# Patient Record
Sex: FEMALE | Race: WHITE
[De-identification: names, ages, dates, MRNs, and addresses within clinical notes are randomized per-mention and may not be internally consistent; named-entity substitution may affect disease eponyms.]

---

## 2020-05-18 ENCOUNTER — HOSPITAL ENCOUNTER (EMERGENCY)
Dept: HOSPITAL 47 - EC | Age: 85
Discharge: HOME | End: 2020-05-18
Payer: MEDICARE

## 2020-05-18 VITALS — DIASTOLIC BLOOD PRESSURE: 71 MMHG | SYSTOLIC BLOOD PRESSURE: 110 MMHG | HEART RATE: 70 BPM

## 2020-05-18 VITALS — TEMPERATURE: 97 F | RESPIRATION RATE: 14 BRPM

## 2020-05-18 DIAGNOSIS — Z88.5: ICD-10-CM

## 2020-05-18 DIAGNOSIS — Z88.0: ICD-10-CM
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
## 2020-05-18 DIAGNOSIS — S32.592A: Primary | ICD-10-CM
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
## 2020-05-18 LAB
ALBUMIN SERPL-MCNC: 2.9 G/DL (ref 3.5–5)
ALP SERPL-CCNC: 122 U/L (ref 38–126)
ALT SERPL-CCNC: 20 U/L (ref 4–34)
ANION GAP SERPL CALC-SCNC: 10 MMOL/L
APTT BLD: 25.8 SEC (ref 22–30)
AST SERPL-CCNC: 93 U/L (ref 14–36)
BASOPHILS # BLD AUTO: 0 K/UL (ref 0–0.2)
BASOPHILS NFR BLD AUTO: 1 %
BUN SERPL-SCNC: 8 MG/DL (ref 7–17)
CALCIUM SPEC-MCNC: 8.6 MG/DL (ref 8.4–10.2)
CHLORIDE SERPL-SCNC: 105 MMOL/L (ref 98–107)
CO2 SERPL-SCNC: 22 MMOL/L (ref 22–30)
EOSINOPHIL # BLD AUTO: 0.1 K/UL (ref 0–0.7)
EOSINOPHIL NFR BLD AUTO: 2 %
ERYTHROCYTE [DISTWIDTH] IN BLOOD BY AUTOMATED COUNT: 3.72 M/UL (ref 3.8–5.4)
ERYTHROCYTE [DISTWIDTH] IN BLOOD: 15.5 % (ref 11.5–15.5)
GLUCOSE SERPL-MCNC: 98 MG/DL (ref 74–99)
HCT VFR BLD AUTO: 35.3 % (ref 34–46)
HGB BLD-MCNC: 11.3 GM/DL (ref 11.4–16)
INR PPP: 1.4 (ref ?–1.2)
LYMPHOCYTES # SPEC AUTO: 0.6 K/UL (ref 1–4.8)
LYMPHOCYTES NFR SPEC AUTO: 13 %
MCH RBC QN AUTO: 30.3 PG (ref 25–35)
MCHC RBC AUTO-ENTMCNC: 32 G/DL (ref 31–37)
MCV RBC AUTO: 94.8 FL (ref 80–100)
MONOCYTES # BLD AUTO: 0.2 K/UL (ref 0–1)
MONOCYTES NFR BLD AUTO: 5 %
NEUTROPHILS # BLD AUTO: 3.4 K/UL (ref 1.3–7.7)
NEUTROPHILS NFR BLD AUTO: 79 %
PLATELET # BLD AUTO: 139 K/UL (ref 150–450)
POTASSIUM SERPL-SCNC: 4.4 MMOL/L (ref 3.5–5.1)
PROT SERPL-MCNC: 6.9 G/DL (ref 6.3–8.2)
PT BLD: 13.6 SEC (ref 9–12)
SODIUM SERPL-SCNC: 137 MMOL/L (ref 137–145)
WBC # BLD AUTO: 4.3 K/UL (ref 3.8–10.6)

## 2020-05-18 PROCEDURE — 96374 THER/PROPH/DIAG INJ IV PUSH: CPT

## 2020-05-18 PROCEDURE — 85025 COMPLETE CBC W/AUTO DIFF WBC: CPT

## 2020-05-18 PROCEDURE — 85610 PROTHROMBIN TIME: CPT

## 2020-05-18 PROCEDURE — 73502 X-RAY EXAM HIP UNI 2-3 VIEWS: CPT

## 2020-05-18 PROCEDURE — 71045 X-RAY EXAM CHEST 1 VIEW: CPT

## 2020-05-18 PROCEDURE — 99284 EMERGENCY DEPT VISIT MOD MDM: CPT

## 2020-05-18 PROCEDURE — 85730 THROMBOPLASTIN TIME PARTIAL: CPT

## 2020-05-18 PROCEDURE — 36415 COLL VENOUS BLD VENIPUNCTURE: CPT

## 2020-05-18 PROCEDURE — 80053 COMPREHEN METABOLIC PANEL: CPT

## 2020-05-18 PROCEDURE — 73700 CT LOWER EXTREMITY W/O DYE: CPT

## 2020-05-18 NOTE — CT
EXAMINATION TYPE: CT hip LT wo con

 

DATE OF EXAM: 5/18/2020

 

COMPARISON: X-rays of the same date

 

HISTORY: Fall, Lt Hip Pain

 

CT DLP: 337.6 mGycm

Automated exposure control for dose reduction was used.

 

TECHNIQUE: Contiguous axial CT images of the left hip were obtained with coronal and sagittal reforma
tted images. No post process 3-D images were obtained.

 

FINDINGS: 

No acute fracture or dislocation is seen of the left femoral acetabular joint. There are few subchond
ral cysts of the left acetabulum and the posterior wall. Very mild irregularity of the inferior pubic
 ramus on 2 images with no displacement is seen. Mild degenerative change of the pubic bones at the p
ubic symphysis.

 

There is subcutaneous fat stranding over the subtrochanteric and trochanteric region of the left hip 
without well-defined fluid collection to suggest hematoma. There is partial visualization of pelvic a
scites and limited evaluation of the unenhanced bowel.

 

IMPRESSION: 

1. VERY SUBTLE CORTICAL IRREGULARITY OF THE INFERIOR PUBIC RAMUS ON THE LEFT MAY REPRESENT A SUBTLE A
CUTE NONDISPLACED FRACTURE IN THIS NONAMBULATORY PATIENT POST FALL.

2. MILD ARTHROPATHY OF THE LEFT HIP.

3. MILD LEFT HIP SUBCUTANEOUS FAT STRANDING SUGGESTING CONTUSION WITHOUT WELL-FORMED HEMATOMA.

4. PARTIALLY VISUALIZED ASCITES.

## 2020-05-18 NOTE — XR
EXAMINATION TYPE: XR Hip LT and AP Pelvis

 

DATE OF EXAM: 5/18/2020

 

CLINICAL HISTORY: Pelvic and left hip pain.

 

TECHNIQUE: A single AP view of the pelvis is obtained. Two views of the left hip are obtained.  

 

COMPARISON: None.

 

FINDINGS:  

There is no acute fracture/dislocation evident in the pelvis.  The hip and sacroiliac joints appear s
ymmetric and unremarkable.  The overlying soft tissue appears unremarkable.Two views of  hip show no 
acute fracture or dislocation.  No focal lytic or sclerotic lesion seen in the proximal femur. There 
appears to be distended urinary bladder.

 

IMPRESSION:  There is no acute fracture or dislocation in the pelvis or left hip.

## 2020-05-18 NOTE — ED
Fall HPI





- General


Chief Complaint: Fall


Stated Complaint: Fall, L hip pain


Time Seen by Provider: 05/18/20 10:57


Source: patient, RN notes reviewed, old records reviewed


Mode of arrival: wheelchair





- History of Present Illness


Initial Comments: 





Patient is a 85-year-old female who presents emergency Department today with 

chief complaint of left hip pain.  Patient reports that 2-3 days ago Patient was

walking from the bathroom to return to her bed in the middle the night.  She 

reports that it was dark and she tripped and fell landing on her left hip.  She 

reports that her daughter got her into bed and she denied any significant pain 

the first day.  Patient reports that over the past 24 hours she's had severe 

pain, worse with certain movements and is unable to bear weight.  Patient is not

on blood thinners.  She reports that she hit her head slightly but had no loss 

of consciousness and denies any marks over the scalp.  She denies any other 

complaints of pain besides her left hip.  She has no previous orthopedic 

injuries.





- Related Data


                                  Previous Rx's











 Medication  Instructions  Recorded


 


HYDROcodone/APAP 5-325MG [Norco 1 tab PO Q6HR PRN #10 tab 05/18/20





5-325]  











                                    Allergies











Allergy/AdvReac Type Severity Reaction Status Date / Time


 


codeine Allergy  Unknown Verified 05/18/20 10:52


 


Penicillins Allergy  Unknown Verified 05/18/20 10:52





   Childhood  














Review of Systems


ROS Statement: 


Those systems with pertinent positive or pertinent negative responses have been 

documented in the HPI.





ROS Other: All systems not noted in ROS Statement are negative.





Past Medical History


Past Medical History: Thyroid Disorder


History of Any Multi-Drug Resistant Organisms: None Reported


Past Surgical History: Breast Surgery, Cholecystectomy, Hysterectomy


Additional Past Surgical History / Comment(s): R mastectomy


Past Psychological History: Anxiety, Depression


Smoking Status: Never smoker


Past Alcohol Use History: None Reported


Past Drug Use History: None Reported





General Exam


Limitations: no limitations


General appearance: alert, in no apparent distress


Head exam: Present: atraumatic, normocephalic, normal inspection


Eye exam: Present: normal appearance, PERRL, EOMI.  Absent: scleral icterus, 

conjunctival injection, periorbital swelling


ENT exam: Present: normal exam, mucous membranes moist


Neck exam: Present: normal inspection.  Absent: tenderness, meningismus, 

lymphadenopathy


Respiratory exam: Present: normal lung sounds bilaterally.  Absent: respiratory 

distress, wheezes, rales, rhonchi, stridor


Cardiovascular Exam: Present: regular rate, normal rhythm, normal heart sounds. 

Absent: systolic murmur, diastolic murmur, rubs, gallop, clicks


GI/Abdominal exam: Present: soft, normal bowel sounds.  Absent: distended, 

tenderness, guarding, rebound, rigid


Extremities exam: Present: normal inspection, full ROM, normal capillary refill,

other (contusion over L greater trochanter. Pain in inguinal region. Pain with 

ROM of L hip/).  Absent: tenderness, pedal edema, joint swelling, calf 

tenderness


Back exam: Present: normal inspection


Neurological exam: Present: alert, oriented X3, CN II-XII intact


Psychiatric exam: Present: normal affect, normal mood


Skin exam: Present: warm, dry, intact, normal color.  Absent: rash





Course


                                   Vital Signs











  05/18/20 05/18/20 05/18/20





  10:53 14:41 14:44


 


Temperature 98.3 F 97.0 F L 97.0 F L


 


Pulse Rate 86 71 70


 


Respiratory 18 14 14





Rate   


 


Blood Pressure 106/67 110/61 110/71


 


O2 Sat by Pulse 97 96 96





Oximetry   














Medical Decision Making





- Medical Decision Making





85-year-old female presents today after fall complains of pain with ambulation 

over the left leg and left hip.  Initial x-rays are negative for fracture.  The 

pain with ambulation recently completed CT.  His evidence of likely acute 

nondisplaced inferior pubic rami fracture.  Patient was informed of these 

findings.  I discussed the case with Dr. Gamez.  Patient be discharged home at 

this time with pain medication prescription for a walker.  Given referral for 

orthopedic.  Patient is agreeable to treatment plan will comply.  Discussed she 

has any further difficulty fibrillation or home that she can return for reeval

uation.  patient's daughter is agreeable to monitoring the patient and taking 

care for this at home.





- Lab Data


Result diagrams: 


                                 05/18/20 12:01 05/18/20 12:01


                                   Lab Results











  05/18/20 05/18/20 05/18/20 Range/Units





  12:01 12:01 12:01 


 


WBC  4.3    (3.8-10.6)  k/uL


 


RBC  3.72 L    (3.80-5.40)  m/uL


 


Hgb  11.3 L    (11.4-16.0)  gm/dL


 


Hct  35.3    (34.0-46.0)  %


 


MCV  94.8    (80.0-100.0)  fL


 


MCH  30.3    (25.0-35.0)  pg


 


MCHC  32.0    (31.0-37.0)  g/dL


 


RDW  15.5    (11.5-15.5)  %


 


Plt Count  139 L    (150-450)  k/uL


 


Neutrophils %  79    %


 


Lymphocytes %  13    %


 


Monocytes %  5    %


 


Eosinophils %  2    %


 


Basophils %  1    %


 


Neutrophils #  3.4    (1.3-7.7)  k/uL


 


Lymphocytes #  0.6 L    (1.0-4.8)  k/uL


 


Monocytes #  0.2    (0-1.0)  k/uL


 


Eosinophils #  0.1    (0-0.7)  k/uL


 


Basophils #  0.0    (0-0.2)  k/uL


 


Hypochromasia  Slight    


 


PT   13.6 H   (9.0-12.0)  sec


 


INR   1.4 H   (<1.2)  


 


APTT   25.8   (22.0-30.0)  sec


 


Sodium    137  (137-145)  mmol/L


 


Potassium    4.4  (3.5-5.1)  mmol/L


 


Chloride    105  ()  mmol/L


 


Carbon Dioxide    22  (22-30)  mmol/L


 


Anion Gap    10  mmol/L


 


BUN    8  (7-17)  mg/dL


 


Creatinine    0.59  (0.52-1.04)  mg/dL


 


Est GFR (CKD-EPI)AfAm    >90  (>60 ml/min/1.73 sqM)  


 


Est GFR (CKD-EPI)NonAf    85  (>60 ml/min/1.73 sqM)  


 


Glucose    98  (74-99)  mg/dL


 


Calcium    8.6  (8.4-10.2)  mg/dL


 


Total Bilirubin    1.7 H  (0.2-1.3)  mg/dL


 


AST    93 H  (14-36)  U/L


 


ALT    20  (4-34)  U/L


 


Alkaline Phosphatase    122  ()  U/L


 


Total Protein    6.9  (6.3-8.2)  g/dL


 


Albumin    2.9 L  (3.5-5.0)  g/dL














- Radiology Data


Radiology results: report reviewed


Acute fracture dislocation in the posterior left hip.





Chest x-ray shows chronic changes without evidence for cardiomegaly or disease.





CT of the hip shows very subtle cortical irregularity of the inferior pubic rami

the left.  They represent a subtle nonicteric acutefracture must not amble chart

Patient post fall.  Mild arthropathy of the left hip.  Mild subcutaneous fat 

stranding to just a contusion.  Partially visualize ascites.





Disposition


Clinical Impression: 


 Fall, Inferior pubic ramus fracture





Disposition: HOME SELF-CARE


Condition: Good


Instructions (If sedation given, give patient instructions):  Pelvic Fracture 

(ED), Fall Prevention (ED)


Additional Instructions: 


Patient advised to ambulate with a walker.  Follow-up with orthopedic doctor.  

Taking the pain medicine as well as Motrin as prescribed for pain.  Return to 

the ED if any alarming signs or symptoms occur.


Prescriptions: 


HYDROcodone/APAP 5-325MG [Norco 5-325] 1 tab PO Q6HR PRN #10 tab


 PRN Reason: Pain


Is patient prescribed a controlled substance at d/c from ED?: Yes


If prescribed controlled substance>3 days was MAPS reviewed?: Prescribed <3 Days


If opioid is for acute pain is fill amount 7 days or less?: Yes


If Rx opioid, was Start Talking consent form obtained?: Yes


Referrals: 


Nonstaff,Physician [Primary Care Provider] - 1-2 days


Jose Luis Garcia MD [Medical Doctor] - 1-2 days


Time of Disposition: 14:01

## 2020-05-18 NOTE — XR
EXAMINATION TYPE: XR chest 1V

 

DATE OF EXAM: 5/18/2020

 

HISTORY: Shortness of breath.

 

COMPARISON: None.

 

TECHNIQUE: Single view of the chest is submitted.

 

FINDINGS:

Demonstrated are scattered senescent parenchymal change.  

 

There is no evidence for focal infiltrate. Strandy basilar densities may reflect  atelectasis.

 

The heart is within normal limits.

 

Hilar and mediastinal structures are within normal limits.  

 

Degenerative changes are seen of the dorsal spine. 

 

 IMPRESSION: 

 

1.  Chronic changes without evidence for acute pulmonary disease.

## 2020-06-11 ENCOUNTER — HOSPITAL ENCOUNTER (INPATIENT)
Dept: HOSPITAL 47 - EC | Age: 85
LOS: 5 days | Discharge: HOME HEALTH | DRG: 436 | End: 2020-06-16
Attending: HOSPITALIST | Admitting: HOSPITALIST
Payer: MEDICARE

## 2020-06-11 DIAGNOSIS — K76.6: ICD-10-CM

## 2020-06-11 DIAGNOSIS — F32.9: ICD-10-CM

## 2020-06-11 DIAGNOSIS — K59.00: ICD-10-CM

## 2020-06-11 DIAGNOSIS — Z90.710: ICD-10-CM

## 2020-06-11 DIAGNOSIS — K74.60: ICD-10-CM

## 2020-06-11 DIAGNOSIS — Z90.11: ICD-10-CM

## 2020-06-11 DIAGNOSIS — Z79.899: ICD-10-CM

## 2020-06-11 DIAGNOSIS — Z80.3: ICD-10-CM

## 2020-06-11 DIAGNOSIS — C22.0: Primary | ICD-10-CM

## 2020-06-11 DIAGNOSIS — Z88.5: ICD-10-CM

## 2020-06-11 DIAGNOSIS — R18.8: ICD-10-CM

## 2020-06-11 DIAGNOSIS — I85.10: ICD-10-CM

## 2020-06-11 DIAGNOSIS — Z11.59: ICD-10-CM

## 2020-06-11 DIAGNOSIS — F41.9: ICD-10-CM

## 2020-06-11 DIAGNOSIS — Z88.0: ICD-10-CM

## 2020-06-11 DIAGNOSIS — R79.1: ICD-10-CM

## 2020-06-11 DIAGNOSIS — Z85.3: ICD-10-CM

## 2020-06-11 DIAGNOSIS — Z79.01: ICD-10-CM

## 2020-06-11 DIAGNOSIS — I86.4: ICD-10-CM

## 2020-06-11 DIAGNOSIS — D69.6: ICD-10-CM

## 2020-06-11 DIAGNOSIS — Z80.41: ICD-10-CM

## 2020-06-11 DIAGNOSIS — E03.9: ICD-10-CM

## 2020-06-11 DIAGNOSIS — Z66: ICD-10-CM

## 2020-06-11 DIAGNOSIS — Z79.890: ICD-10-CM

## 2020-06-11 LAB
ALBUMIN SERPL-MCNC: 3 G/DL (ref 3.5–5)
ALP SERPL-CCNC: 134 U/L (ref 38–126)
ALT SERPL-CCNC: 17 U/L (ref 4–34)
AMYLASE SERPL-CCNC: 99 U/L (ref 30–110)
ANION GAP SERPL CALC-SCNC: 8 MMOL/L
AST SERPL-CCNC: 103 U/L (ref 14–36)
BASOPHILS # BLD AUTO: 0 K/UL (ref 0–0.2)
BASOPHILS NFR BLD AUTO: 1 %
BUN SERPL-SCNC: 10 MG/DL (ref 7–17)
CALCIUM SPEC-MCNC: 9.3 MG/DL (ref 8.4–10.2)
CHLORIDE SERPL-SCNC: 108 MMOL/L (ref 98–107)
CO2 SERPL-SCNC: 21 MMOL/L (ref 22–30)
EOSINOPHIL # BLD AUTO: 0.1 K/UL (ref 0–0.7)
EOSINOPHIL NFR BLD AUTO: 2 %
ERYTHROCYTE [DISTWIDTH] IN BLOOD BY AUTOMATED COUNT: 3.89 M/UL (ref 3.8–5.4)
ERYTHROCYTE [DISTWIDTH] IN BLOOD: 16 % (ref 11.5–15.5)
GLUCOSE SERPL-MCNC: 93 MG/DL (ref 74–99)
HCT VFR BLD AUTO: 37.1 % (ref 34–46)
HGB BLD-MCNC: 11.4 GM/DL (ref 11.4–16)
LYMPHOCYTES # SPEC AUTO: 0.9 K/UL (ref 1–4.8)
LYMPHOCYTES NFR SPEC AUTO: 16 %
MCH RBC QN AUTO: 29.2 PG (ref 25–35)
MCHC RBC AUTO-ENTMCNC: 30.6 G/DL (ref 31–37)
MCV RBC AUTO: 95.3 FL (ref 80–100)
MONOCYTES # BLD AUTO: 0.3 K/UL (ref 0–1)
MONOCYTES NFR BLD AUTO: 6 %
NEUTROPHILS # BLD AUTO: 4.2 K/UL (ref 1.3–7.7)
NEUTROPHILS NFR BLD AUTO: 75 %
PLATELET # BLD AUTO: 142 K/UL (ref 150–450)
POTASSIUM SERPL-SCNC: 3.6 MMOL/L (ref 3.5–5.1)
PROT SERPL-MCNC: 7.2 G/DL (ref 6.3–8.2)
SODIUM SERPL-SCNC: 137 MMOL/L (ref 137–145)
WBC # BLD AUTO: 5.6 K/UL (ref 3.8–10.6)

## 2020-06-11 PROCEDURE — 82945 GLUCOSE OTHER FLUID: CPT

## 2020-06-11 PROCEDURE — 36415 COLL VENOUS BLD VENIPUNCTURE: CPT

## 2020-06-11 PROCEDURE — 81001 URINALYSIS AUTO W/SCOPE: CPT

## 2020-06-11 PROCEDURE — 85730 THROMBOPLASTIN TIME PARTIAL: CPT

## 2020-06-11 PROCEDURE — 74183 MRI ABD W/O CNTR FLWD CNTR: CPT

## 2020-06-11 PROCEDURE — 86304 IMMUNOASSAY TUMOR CA 125: CPT

## 2020-06-11 PROCEDURE — 49083 ABD PARACENTESIS W/IMAGING: CPT

## 2020-06-11 PROCEDURE — 74177 CT ABD & PELVIS W/CONTRAST: CPT

## 2020-06-11 PROCEDURE — 71046 X-RAY EXAM CHEST 2 VIEWS: CPT

## 2020-06-11 PROCEDURE — 84157 ASSAY OF PROTEIN OTHER: CPT

## 2020-06-11 PROCEDURE — 88313 SPECIAL STAINS GROUP 2: CPT

## 2020-06-11 PROCEDURE — 88305 TISSUE EXAM BY PATHOLOGIST: CPT

## 2020-06-11 PROCEDURE — 82378 CARCINOEMBRYONIC ANTIGEN: CPT

## 2020-06-11 PROCEDURE — 99285 EMERGENCY DEPT VISIT HI MDM: CPT

## 2020-06-11 PROCEDURE — 82105 ALPHA-FETOPROTEIN SERUM: CPT

## 2020-06-11 PROCEDURE — 85025 COMPLETE CBC W/AUTO DIFF WBC: CPT

## 2020-06-11 PROCEDURE — 88307 TISSUE EXAM BY PATHOLOGIST: CPT

## 2020-06-11 PROCEDURE — 89050 BODY FLUID CELL COUNT: CPT

## 2020-06-11 PROCEDURE — 84484 ASSAY OF TROPONIN QUANT: CPT

## 2020-06-11 PROCEDURE — 80053 COMPREHEN METABOLIC PANEL: CPT

## 2020-06-11 PROCEDURE — 76942 ECHO GUIDE FOR BIOPSY: CPT

## 2020-06-11 PROCEDURE — 88108 CYTOPATH CONCENTRATE TECH: CPT

## 2020-06-11 PROCEDURE — 74018 RADEX ABDOMEN 1 VIEW: CPT

## 2020-06-11 PROCEDURE — 83615 LACTATE (LD) (LDH) ENZYME: CPT

## 2020-06-11 PROCEDURE — 87075 CULTR BACTERIA EXCEPT BLOOD: CPT

## 2020-06-11 PROCEDURE — 87086 URINE CULTURE/COLONY COUNT: CPT

## 2020-06-11 PROCEDURE — 83880 ASSAY OF NATRIURETIC PEPTIDE: CPT

## 2020-06-11 PROCEDURE — 80074 ACUTE HEPATITIS PANEL: CPT

## 2020-06-11 PROCEDURE — 83690 ASSAY OF LIPASE: CPT

## 2020-06-11 PROCEDURE — 47000 NEEDLE BIOPSY OF LIVER PERQ: CPT

## 2020-06-11 PROCEDURE — 85027 COMPLETE CBC AUTOMATED: CPT

## 2020-06-11 PROCEDURE — 86300 IMMUNOASSAY TUMOR CA 15-3: CPT

## 2020-06-11 PROCEDURE — 87205 SMEAR GRAM STAIN: CPT

## 2020-06-11 PROCEDURE — 82150 ASSAY OF AMYLASE: CPT

## 2020-06-11 PROCEDURE — 85610 PROTHROMBIN TIME: CPT

## 2020-06-11 PROCEDURE — 88342 IMHCHEM/IMCYTCHM 1ST ANTB: CPT

## 2020-06-11 PROCEDURE — 93005 ELECTROCARDIOGRAM TRACING: CPT

## 2020-06-11 PROCEDURE — 87070 CULTURE OTHR SPECIMN AEROBIC: CPT

## 2020-06-11 PROCEDURE — 88341 IMHCHEM/IMCYTCHM EA ADD ANTB: CPT

## 2020-06-11 NOTE — CT
EXAMINATION TYPE: CT abdomen pelvis w con

 

DATE OF EXAM: 6/11/2020

 

COMPARISON: None

 

HISTORY: ABDOMINAL DISTENTION

 

CT DLP: 1054 mGycm

Automated exposure control for dose reduction was used.

 

CONTRAST: 

Performed with IV Contrast, patient injected with 100 mL of Isovue 300.

 

There is some patchy atelectasis at the lung bases. Heart is enlarged. There is no pericardial effusi
on. There is no pleural effusion. There is irregular contour of the liver consistent with cirrhosis. 
There is mixed density rounded mass that measures 5 cm in the right lobe of the liver. There are clip
s from cholecystectomy. Spleen is intact. There is no evidence of pancreatic mass. The stomach is int
act.

 

There is moderate abdominal ascites fluid. There is no adrenal mass. Kidneys show satisfactory contra
st opacification. There is no hydronephrosis. Bladder distends smoothly. Ureters are not dilated. The
re is no retroperitoneal adenopathy. The lumbar vertebra have normal alignment. There is no compressi
on fracture. The bony pelvis is intact. There is some mild edema and wall thickening involving the as
cending colon and transverse colon.

 

IMPRESSION:

Moderate abdominal ascites. Irregular liver margin consistent with cirrhosis. 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

 

Mixed density mass in the liver suspicious for primary malignancy.

 

Wall thickening of the ascending colon and transverse colon suggestive of nonspecific colitis.

## 2020-06-11 NOTE — XR
EXAMINATION TYPE: XR KUB 2 VIEWS

 

DATE OF EXAM: 6/11/2020 9:25 PM

 

CLINICAL HISTORY:  Pain

 

TECHNIQUE: Single supine KUB image of the abdomen is obtained.

 

COMPARISON: None.

 

FINDINGS: There is a homogeneous added opacity diffusely over all 4 quadrants, suggesting peritoneal 
fluid throughout the abdomen and pelvis. 

 

Scattered gas is seen in non-distended small bowel loops. 

Gas and fecal material is seen in non-distended colon. 

There is no visceromegaly, pneumoperitoneum, or abnormal calcification appreciated. 

The lung bases are clear and the osseous structures are intact.

 

IMPRESSION: Findings suggest large volume peritoneal fluid.

## 2020-06-11 NOTE — XR
EXAMINATION: XR chest 2V

DATE AND TIME:  6/11/2020 9:25 PM

 

CLINICAL INDICATION: Abdominal pain, distention, leg swelling

 

TECHNIQUE: PA and lateral

 

COMPARISON: 5/18/2020

 

FINDINGS: EKG leads noted.

 

The lungs are clear. 

 

The pleural spaces are negative.

 

The cardiac silhouette is not enlarged. The remainder of the mediastinal silhouette is unremarkable. 


 

The skeletal structures and soft tissues are negative for acute findings.

 

IMPRESSION:

NO ACUTE PROCESS.

## 2020-06-11 NOTE — ED
Abdominal Pain HPI





- General


Chief Complaint: Abdominal Pain


Stated Complaint: Feet swelling


Time Seen by Provider: 06/11/20 20:14


Source: patient, family


Mode of arrival: ambulatory


Limitations: no limitations





- History of Present Illness


Initial Comments: 


84yo female presenting today for chief complaint of bilateral leg swelling, 

abdominal pain and distention.  Patient states she is diffuse abdominal 

discomfort or she is not sure she is constipated she had very small bowel 

movements for the past week.  Patient states that her belly seems to continue to

grow.  She denies any chest pain shortness of breath or difficulty lying flat.  

Patient has a known history of cancer.  Patient denies any chest pain.  Patient 

denies a history of congestive heart failure or use of Lasix.  Immediately 

system negative upon arrival patient is a very obvious abdominal distention








- Related Data


                                  Previous Rx's











 Medication  Instructions  Recorded


 


HYDROcodone/APAP 5-325MG [Norco 1 tab PO Q6HR PRN #10 tab 05/18/20





5-325]  











                                    Allergies











Allergy/AdvReac Type Severity Reaction Status Date / Time


 


codeine Allergy  Unknown Verified 06/11/20 20:11


 


Penicillins Allergy  Unknown Verified 06/11/20 20:11





   Childhood  














Review of Systems


ROS Statement: 


Those systems with pertinent positive or pertinent negative responses have been 

documented in the HPI.





ROS Other: All systems not noted in ROS Statement are negative.





Past Medical History


Past Medical History: Cancer, GERD/Reflux, Thyroid Disorder


Additional Past Medical History / Comment(s): breast cancer 1980.


History of Any Multi-Drug Resistant Organisms: None Reported


Past Surgical History: Breast Surgery, Cholecystectomy, Hysterectomy


Additional Past Surgical History / Comment(s): R mastectomy


Past Psychological History: Anxiety, Depression


Smoking Status: Never smoker


Past Alcohol Use History: None Reported


Past Drug Use History: None Reported





General Exam





- General Exam Comments


Initial Comments: 


General:  The patient is awake and alert, in no distress


Eye:  Pupils are equal, round and reactive to light, extra-ocular movements are 

intact.  No nystagmus.  There is normal conjunctiva bilaterally.  No signs of 

icterus.  


Ears, nose, mouth and throat:  There are moist mucous membranes and no oral 

lesions. 


Neck:  The neck is supple, there is no tenderness or JVD.  


Cardiovascular:  There is a regular rate and rhythm. No murmur, rub or gallop is

appreciated.


Respiratory:  Lungs are clear to auscultation, respirations are non-labored, 

breath sounds are equal.  No wheezes, stridor, rales, or rhonchi.


Gastrointestinal:  Soft, distended, diffuse mild tenderness to palpation of the 

abdomen without masses or obvious organomegaly noted. There is no rebound or 

guarding present. 


Musculoskeletal:  Normal ROM, no tenderness.  Strength 5/5. Sensation intact. 

Radialand DP pulses equal bilaterally 2+.  


Neurological:  A&O x 3. CN II-XII intact, There are no obvious motor or sensory 

deficits. Coordination appears grossly intact. Speech is normal.


Skin:  Skin is warm and dry and no rashes or lesions are noted. B/l pitting LE 

edema.


Psychiatric:  Cooperative, appropriate mood & affect, normal judgment.  





Limitations: no limitations





Course





                                   Vital Signs











  06/11/20 06/11/20 06/11/20





  20:06 21:10 21:30


 


Temperature 97.9 F  


 


Pulse Rate 89 85 77


 


Respiratory 18 28 H 21





Rate   


 


Blood Pressure 128/72 139/70 139/70


 


O2 Sat by Pulse 96 96 97





Oximetry   














  06/11/20 06/11/20





  22:00 22:30


 


Temperature  


 


Pulse Rate 77 87


 


Respiratory 19 16





Rate  


 


Blood Pressure 137/67 135/69


 


O2 Sat by Pulse 96 97





Oximetry  














Medical Decision Making





- Medical Decision Making


85-year-old female presenting today for chief complaint of abdominal distention.

 Concern for constipation.  There was some stool noted on CT however more so 

peritoneal fluid consistent with ascites.  There also is concerning for primary 

liver malignancy with patient labs of alkaline phosphatase increase as well as 

bilirubin increase which is consistent with liver disease. Patient has 

significant distention and may benefit from paracentesis. No fevers concerning 

for SBP. patient will be admitted for further evaluation of possible malignancy 

and for possible therapeutic paracentesis. Case discussed with Dr. Medina who 

is agreeable to care plan. Dr. Antoine accepted admission- requesting GI and 

Oncology consultations to be placed.





- Lab Data


Result diagrams: 


                                 06/11/20 21:05 06/11/20 21:05





                                   Lab Results











  06/11/20 06/11/20 06/11/20 Range/Units





  21:05 21:05 21:05 


 


WBC  5.6    (3.8-10.6)  k/uL


 


RBC  3.89    (3.80-5.40)  m/uL


 


Hgb  11.4    (11.4-16.0)  gm/dL


 


Hct  37.1    (34.0-46.0)  %


 


MCV  95.3    (80.0-100.0)  fL


 


MCH  29.2    (25.0-35.0)  pg


 


MCHC  30.6 L    (31.0-37.0)  g/dL


 


RDW  16.0 H    (11.5-15.5)  %


 


Plt Count  142 L    (150-450)  k/uL


 


Neutrophils %  75    %


 


Lymphocytes %  16    %


 


Monocytes %  6    %


 


Eosinophils %  2    %


 


Basophils %  1    %


 


Neutrophils #  4.2    (1.3-7.7)  k/uL


 


Lymphocytes #  0.9 L    (1.0-4.8)  k/uL


 


Monocytes #  0.3    (0-1.0)  k/uL


 


Eosinophils #  0.1    (0-0.7)  k/uL


 


Basophils #  0.0    (0-0.2)  k/uL


 


Hypochromasia  Slight    


 


Anisocytosis  Slight    


 


Sodium   137   (137-145)  mmol/L


 


Potassium   3.6   (3.5-5.1)  mmol/L


 


Chloride   108 H   ()  mmol/L


 


Carbon Dioxide   21 L   (22-30)  mmol/L


 


Anion Gap   8   mmol/L


 


BUN   10   (7-17)  mg/dL


 


Creatinine   0.56   (0.52-1.04)  mg/dL


 


Est GFR (CKD-EPI)AfAm   >90   (>60 ml/min/1.73 sqM)  


 


Est GFR (CKD-EPI)NonAf   87   (>60 ml/min/1.73 sqM)  


 


Glucose   93   (74-99)  mg/dL


 


Calcium   9.3   (8.4-10.2)  mg/dL


 


Total Bilirubin   1.9 H   (0.2-1.3)  mg/dL


 


AST   103 H   (14-36)  U/L


 


ALT   17   (4-34)  U/L


 


Alkaline Phosphatase   134 H   ()  U/L


 


Troponin I    <0.012  (0.000-0.034)  ng/mL


 


NT-Pro-B Natriuret Pep     pg/mL


 


Total Protein   7.2   (6.3-8.2)  g/dL


 


Albumin   3.0 L   (3.5-5.0)  g/dL


 


Amylase   99   ()  U/L


 


Lipase   148   ()  U/L














  06/11/20 Range/Units





  21:05 


 


WBC   (3.8-10.6)  k/uL


 


RBC   (3.80-5.40)  m/uL


 


Hgb   (11.4-16.0)  gm/dL


 


Hct   (34.0-46.0)  %


 


MCV   (80.0-100.0)  fL


 


MCH   (25.0-35.0)  pg


 


MCHC   (31.0-37.0)  g/dL


 


RDW   (11.5-15.5)  %


 


Plt Count   (150-450)  k/uL


 


Neutrophils %   %


 


Lymphocytes %   %


 


Monocytes %   %


 


Eosinophils %   %


 


Basophils %   %


 


Neutrophils #   (1.3-7.7)  k/uL


 


Lymphocytes #   (1.0-4.8)  k/uL


 


Monocytes #   (0-1.0)  k/uL


 


Eosinophils #   (0-0.7)  k/uL


 


Basophils #   (0-0.2)  k/uL


 


Hypochromasia   


 


Anisocytosis   


 


Sodium   (137-145)  mmol/L


 


Potassium   (3.5-5.1)  mmol/L


 


Chloride   ()  mmol/L


 


Carbon Dioxide   (22-30)  mmol/L


 


Anion Gap   mmol/L


 


BUN   (7-17)  mg/dL


 


Creatinine   (0.52-1.04)  mg/dL


 


Est GFR (CKD-EPI)AfAm   (>60 ml/min/1.73 sqM)  


 


Est GFR (CKD-EPI)NonAf   (>60 ml/min/1.73 sqM)  


 


Glucose   (74-99)  mg/dL


 


Calcium   (8.4-10.2)  mg/dL


 


Total Bilirubin   (0.2-1.3)  mg/dL


 


AST   (14-36)  U/L


 


ALT   (4-34)  U/L


 


Alkaline Phosphatase   ()  U/L


 


Troponin I   (0.000-0.034)  ng/mL


 


NT-Pro-B Natriuret Pep  239  pg/mL


 


Total Protein   (6.3-8.2)  g/dL


 


Albumin   (3.5-5.0)  g/dL


 


Amylase   ()  U/L


 


Lipase   ()  U/L














Disposition


Clinical Impression: 


 Liver lesion, Liver malignancy, Ascites, Elevated alkaline phosphatase level, 

Elevated bilirubin, Abdominal distention, Abdominal discomfort





Disposition: ADMITTED AS IP TO THIS [facility]


Condition: Stable


Is patient prescribed a controlled substance at d/c from ED?: No


Referrals: 


Nonstaff,Physician [Primary Care Provider] - 1-2 days


Time of Disposition: 23:26


Decision to Admit Reason: Admit from EC


Decision Date: 06/11/20


Decision Time: 23:26

## 2020-06-12 LAB
ALBUMIN SERPL-MCNC: 2.5 G/DL (ref 3.5–5)
ALP SERPL-CCNC: 115 U/L (ref 38–126)
ALT SERPL-CCNC: 15 U/L (ref 4–34)
ANION GAP SERPL CALC-SCNC: 6 MMOL/L
APTT BLD: 27.9 SEC (ref 22–30)
AST SERPL-CCNC: 69 U/L (ref 14–36)
BUN SERPL-SCNC: 9 MG/DL (ref 7–17)
CALCIUM SPEC-MCNC: 8.5 MG/DL (ref 8.4–10.2)
CELL CNT PNL FLD: 100
CHLORIDE SERPL-SCNC: 108 MMOL/L (ref 98–107)
CO2 SERPL-SCNC: 24 MMOL/L (ref 22–30)
GLUCOSE SERPL-MCNC: 79 MG/DL (ref 74–99)
HYALINE CASTS UR QL AUTO: 1 /LPF (ref 0–2)
INR PPP: 1.4 (ref ?–1.2)
MONONUC CELLS # FLD: 100 %
NUC CELL # FLD: 24 /UL
PH UR: 6.5 [PH] (ref 5–8)
POTASSIUM SERPL-SCNC: 3.7 MMOL/L (ref 3.5–5.1)
PROT FLD-MCNC: 1430 MG/DL
PROT SERPL-MCNC: 6.2 G/DL (ref 6.3–8.2)
PT BLD: 13.8 SEC (ref 9–12)
RBC UR QL: 29 /HPF (ref 0–5)
SODIUM SERPL-SCNC: 138 MMOL/L (ref 137–145)
SP GR UR: 1.05 (ref 1–1.03)
SQUAMOUS UR QL AUTO: <1 /HPF (ref 0–4)
UROBILINOGEN UR QL STRIP: <2 MG/DL (ref ?–2)
WBC # UR AUTO: 10 /HPF (ref 0–5)

## 2020-06-12 PROCEDURE — 0W9G3ZX DRAINAGE OF PERITONEAL CAVITY, PERCUTANEOUS APPROACH, DIAGNOSTIC: ICD-10-PCS

## 2020-06-12 RX ADMIN — CEFAZOLIN SCH: 330 INJECTION, POWDER, FOR SOLUTION INTRAMUSCULAR; INTRAVENOUS at 00:39

## 2020-06-12 RX ADMIN — FUROSEMIDE SCH MG: 40 TABLET ORAL at 19:59

## 2020-06-12 RX ADMIN — SPIRONOLACTONE SCH MG: 25 TABLET, FILM COATED ORAL at 19:59

## 2020-06-12 NOTE — P.CONS
History of Present Illness





- Reason for Consult


Consult date: 06/12/20


Liver Abnormality


Requesting physician: Dorothea Navarrete





- Chief Complaint


Abdominal Ascites





- History of Present Illness





This is a very pleasant 85 year old female who originally presented with recent 

increasing abdominal bloating. Patient states she thought it was gas and 

constipation over the past couple weeks, although as it progressed she presented

to ED for further evaluation. She has a remote (in her 40s) history of right 

sided breast cancer with mastectomy and chemotherapy. CT abdomen and pelvis 

reveals abormality of liver (concern for chronic liver dz plus probable masslike

density). Also associated increased abdominal ascites. She is status post 

paracentesis and cytology is pending. GI is also following and an MRI of liver 

was ordered. Because of the concern for a malignant process medical oncology has

een consulted to further evaluate. 





Review of Systems





A 14 point review of systems assessed and completed and all negative except HPI





Past Medical History


Past Medical History: Cancer, GERD/Reflux, Thyroid Disorder


Additional Past Medical History / Comment(s): breast cancer 1980.


History of Any Multi-Drug Resistant Organisms: None Reported


Past Surgical History: Breast Surgery, Cholecystectomy, Hysterectomy


Additional Past Surgical History / Comment(s): R mastectomy


Past Psychological History: Anxiety, Depression


Smoking Status: Never smoker


Past Alcohol Use History: None Reported


Past Drug Use History: None Reported





- Past Family History


  ** Mother


Family Medical History: Cancer





Medications and Allergies


                                Home Medications











 Medication  Instructions  Recorded  Confirmed  Type


 


HYDROcodone/APAP 5-325MG [Norco 1 tab PO Q6HR PRN #10 tab 05/18/20 06/12/20 Rx





5-325]    


 


Benzonatate [Tessalon Perles] 100 mg PO HS 06/12/20 06/12/20 History


 


Cholecalciferol [Vitamin D3 (25 1,000 unit PO DAILY 06/12/20 06/12/20 History





Mcg = 1000 Iu)]    


 


FLUoxetine HCL 40 mg PO DAILY 06/12/20 06/12/20 History


 


Levothyroxine Sodium [Synthroid] 150 mcg PO 0600 06/12/20 06/12/20 History


 


Pantoprazole [Protonix] 40 mg PO DAILY PRN 06/12/20 06/12/20 History








                                    Allergies











Allergy/AdvReac Type Severity Reaction Status Date / Time


 


codeine Allergy  Unknown Verified 06/11/20 20:11


 


Penicillins Allergy  Unknown Verified 06/11/20 20:11





   Childhood  














Physical Exam


Vitals: 


                                   Vital Signs











  Temp Pulse Pulse Resp BP BP Pulse Ox


 


 06/12/20 13:00  98.0 F   79  16   115/63  96


 


 06/12/20 12:02    77  16   121/69  95


 


 06/12/20 11:30    80  16   120/63  94 L


 


 06/12/20 11:05    77  16   122/65  95


 


 06/12/20 05:00  98.1 F   88  18   117/58  93 L


 


 06/12/20 00:41  98.0 F   78  16   141/61  94 L


 


 06/12/20 00:00   82   16  137/71   96


 


 06/11/20 22:30   87   16  135/69   97


 


 06/11/20 22:00   77   19  137/67   96


 


 06/11/20 21:30   77   21  139/70   97


 


 06/11/20 21:10   85   28 H  139/70   96


 


 06/11/20 20:06  97.9 F  89   18  128/72   96








                                Intake and Output











 06/12/20 06/12/20 06/12/20





 06:59 14:59 22:59


 


Intake Total 260 160 


 


Balance 260 160 


 


Intake:   


 


  Intake, IV Titration 140 160 





  Amount   


 


    Sodium Chloride 0.9% 1, 140 160 





    000 ml @ 20 mls/hr IV .   





    Q24H [facility] Rx#:[id number]   


 


  Oral 120  


 


Other:   


 


  # Voids  1 


 


  Weight 63.503 kg  














- Constitutional


General appearance: cooperative, no acute distress





- EENT


Eyes: EOMI, dentition normal


ENT: NA/AT, normal oropharynx





- Neck





Supple


Neck: normal ROM





- Respiratory


Respiratory: bilateral: diminished (lower lobes)





- Cardiovascular


Rhythm: regular


Heart sounds: normal: S1, S2





- Gastrointestinal


General gastrointestinal: distended, soft





- Integumentary


Integumentary: pale





- Neurologic





non-focal





- Musculoskeletal


Musculoskeletal: generalized weakness





- Psychiatric


Psychiatric: A&O x's 3, appropriate affect, intact judgment & insight





Results


CBC & Chem 7: 


                                 06/11/20 21:05





                                 06/12/20 05:59


Labs: 


                  Abnormal Lab Results - Last 24 Hours (Table)











  06/11/20 06/11/20 06/12/20 Range/Units





  21:05 21:05 05:59 


 


MCHC  30.6 L    (31.0-37.0)  g/dL


 


RDW  16.0 H    (11.5-15.5)  %


 


Plt Count  142 L    (150-450)  k/uL


 


Lymphocytes #  0.9 L    (1.0-4.8)  k/uL


 


PT    13.8 H  (9.0-12.0)  sec


 


INR    1.4 H  (<1.2)  


 


Chloride   108 H   ()  mmol/L


 


Carbon Dioxide   21 L   (22-30)  mmol/L


 


Total Bilirubin   1.9 H   (0.2-1.3)  mg/dL


 


AST   103 H   (14-36)  U/L


 


Alkaline Phosphatase   134 H   ()  U/L


 


Total Protein     (6.3-8.2)  g/dL


 


Albumin   3.0 L   (3.5-5.0)  g/dL


 


Ur Specific Gravity     (1.001-1.035)  


 


Ur Leukocyte Esterase     (Negative)  


 


Urine RBC     (0-5)  /hpf


 


Urine WBC     (0-5)  /hpf


 


Calcium Oxalate Crystal     (None)  /hpf


 


Urine Bacteria     (None)  /hpf


 


Urine Mucus     (None)  /hpf














  06/12/20 06/12/20 Range/Units





  05:59 06:25 


 


MCHC    (31.0-37.0)  g/dL


 


RDW    (11.5-15.5)  %


 


Plt Count    (150-450)  k/uL


 


Lymphocytes #    (1.0-4.8)  k/uL


 


PT    (9.0-12.0)  sec


 


INR    (<1.2)  


 


Chloride  108 H   ()  mmol/L


 


Carbon Dioxide    (22-30)  mmol/L


 


Total Bilirubin    (0.2-1.3)  mg/dL


 


AST  69 H   (14-36)  U/L


 


Alkaline Phosphatase    ()  U/L


 


Total Protein  6.2 L   (6.3-8.2)  g/dL


 


Albumin  2.5 L   (3.5-5.0)  g/dL


 


Ur Specific Gravity   1.049 H  (1.001-1.035)  


 


Ur Leukocyte Esterase   Moderate H  (Negative)  


 


Urine RBC   29 H  (0-5)  /hpf


 


Urine WBC   10 H  (0-5)  /hpf


 


Calcium Oxalate Crystal   Rare H  (None)  /hpf


 


Urine Bacteria   Rare H  (None)  /hpf


 


Urine Mucus   Rare H  (None)  /hpf











CT scan - abdomen: report reviewed


CT scan - pelvis: report reviewed





Assessment and Plan


Plan: 





Assessment and Recommendations:





New Liver Abnormality:


 - Await MRI to further evaluate


 - If definite Mass identified will need further evaluation with tissue biopsy





Abdominal Ascites:


 - Acute Onset:


 - Status POst Paracentesis


 - Await Cytology





Remote History Right Breast Cancer:


 - CHemo and Mastectomy


 - She was in her 40s, and two sisters with breast cancer under 50 as well 





Spoke to Patients Daughter Tuyet 


Greater than 30minutes spent with patient and daughter counseling and 

cordinating care


Physician Attest: I have completed the full history and physical and agree with 

above dictation, dictated as a scribe.

## 2020-06-12 NOTE — P.HPIM
History of Present Illness


H&P Date: 06/12/20





The patient is an 85-year-old female with a PMH of hypothyroidism, and history 

of breast cancer status post right mastectomy (1980s) who presented to the ED 

with complaints of abdominal distention and bloating.  The patient notes that 

her symptoms started acutely 2 weeks ago and have gradually worsened to the 

point where she now has difficulty sleeping due to her distention and has been 

using a walker as she has been getting progressively weaker during this time.  

She however denied overt abdominal pain, nausea, vomiting, or diarrhea.  She 

endorsed regular bowel movements with occasional constipation.  The patient also

reported anorexia over the past few months with decreased appetite and some 

weight loss.  She otherwise denied any additional complaints including chest 

pain, shortness of breath, fever, chills, cough, dizziness, or bleeding.  She 

underwent an extensive evaluation in the emergency room with CT abdomen and 

pelvis with contrast showing moderate abdominal ascites with irregular liver 

margin consistent with cirrhosis along with gastroesophageal varices and 

umbilical varices consistent with portal hypertension.  A mixed density mass in 

the liver suspicious for primary malignancy was also noted.  EKG had revealed a 

normal sinus rhythm at 77 bpm with no ST/T-wave changes noted as reviewed by me.

 Laboratory evaluation revealed an obese, 5.6, hemoglobin 11.4, platelets 142, 

sodium 137, potassium 3.6, chloride 108, CO2 21, BUN 10, creatinine 0.56, total 

bilirubin 1.9, , ALT 17, alkaline phosphatase elevated at 134, troponin 

less than 0.012, and albumin low at 3.0.





Review of Systems





Pertinent positives and negatives as discussed in HPI, a complete review of 

systems was performed and all other systems are negative.





Past Medical History


Past Medical History: Cancer, GERD/Reflux, Thyroid Disorder


Additional Past Medical History / Comment(s): breast cancer 1980.


History of Any Multi-Drug Resistant Organisms: None Reported


Past Surgical History: Breast Surgery, Cholecystectomy, Hysterectomy


Additional Past Surgical History / Comment(s): R mastectomy


Past Psychological History: Anxiety, Depression


Smoking Status: Never smoker


Past Alcohol Use History: None Reported


Past Drug Use History: None Reported





Medications and Allergies


                                Home Medications











 Medication  Instructions  Recorded  Confirmed  Type


 


HYDROcodone/APAP 5-325MG [Norco 1 tab PO Q6HR PRN #10 tab 05/18/20  Rx





5-325]    








                                    Allergies











Allergy/AdvReac Type Severity Reaction Status Date / Time


 


codeine Allergy  Unknown Verified 06/11/20 20:11


 


Penicillins Allergy  Unknown Verified 06/11/20 20:11





   Childhood  














Physical Exam


Vitals: 


                                   Vital Signs











  Temp Pulse Resp BP Pulse Ox


 


 06/12/20 00:00   82  18  137/71  96


 


 06/11/20 22:30   87  16  135/69  97


 


 06/11/20 22:00   77  19  137/67  96


 


 06/11/20 21:30   77  21  139/70  97


 


 06/11/20 21:10   85  28 H  139/70  96


 


 06/11/20 20:06  97.9 F  89  18  128/72  96








                                Intake and Output











 06/11/20 06/11/20 06/12/20





 14:59 22:59 06:59


 


Other:   


 


  Weight  63.503 kg 














General: non toxic, no distress, appears at stated age, normal weight


Derm: no unusual rashes/lesions no unusual ecchymoses, warm, dry


Head: atraumatic, normocephalic, symmetric


Eyes: EOMI, no lid lag, anicteric sclera, pupils equal round reactive to light


ENT: Nose and ears atraumatic, no thrush,  no pharyngeal erythema


Neck: No thyromegaly, no cervical lymphadenopathy, trachea midline, supple


Mouth: no lip lesion, mucus membranes moist


Cardiovascular: S1S2 reg, no murmur, positive posterior tibial pulse bilateral, 

2+ LE edema myah, capillary refill less than 2 seconds


Lungs: Mild bibasilar rales, no rhonchi, no accessory muscle use


Abdominal: Very distended with fluid thrill positive, nontender to palpation, no

guarding


Ext: no gross muscle atrophy,  muscle strength 3 out of 5 in all 4 extremities 

grossly, no contractures


Neuro:  CN II-XI grossly intact, light touch intact all 4 extremities, finger to

nose within normal limits


Psych: Alert, oriented, appropriate affect 





Results


CBC & Chem 7: 


                                 06/11/20 21:05





                                 06/11/20 21:05


Labs: 


                  Abnormal Lab Results - Last 24 Hours (Table)











  06/11/20 06/11/20 Range/Units





  21:05 21:05 


 


MCHC  30.6 L   (31.0-37.0)  g/dL


 


RDW  16.0 H   (11.5-15.5)  %


 


Plt Count  142 L   (150-450)  k/uL


 


Lymphocytes #  0.9 L   (1.0-4.8)  k/uL


 


Chloride   108 H  ()  mmol/L


 


Carbon Dioxide   21 L  (22-30)  mmol/L


 


Total Bilirubin   1.9 H  (0.2-1.3)  mg/dL


 


AST   103 H  (14-36)  U/L


 


Alkaline Phosphatase   134 H  ()  U/L


 


Albumin   3.0 L  (3.5-5.0)  g/dL














Assessment and Plan


Plan: 





Abdominal distention with ascites in setting of lung mass and portal 

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


-Obtain tumor markers


-Consult GI and oncology


-Consult interventional radiology for diagnostic and therapeutic paracentesis


-Fluid restriction for now





Thrombocytopenia


-Likely secondary to ongoing malignancy


-Monitor for now





DVT prophylaxis


-IPCDs





The patient is admitted with an anticipated greater than 2 midnight stay for 

evaluation of abdominal distension


CODE STATUS: No Code (patient states she does not wish to undergo CPR or be 

placed on a ventilator in the event of a cardiac arrest.  She reports that she 

may consider elective forms of life support on a need by need bases)


Discussed with: Patient


Anticipated discharge date: 2-3 days


Anticipated discharge place: Home


A total of 35 minutes was spent on the care of this complex patient more than 

50% of the time was spent in counseling and care coordination.

## 2020-06-12 NOTE — CONS
CONSULTATION



DATE OF DICTATION:

06/12/2020



REASON FOR CONSULTATION:

Ascites and liver mass.



HISTORY OF PRESENT ILLNESS:

The patient is an 85-year-old pleasant white female admitted to the hospital because of

progressive abdominal distention for the last 2 weeks' duration. She has prior history

of breast cancer with right mastectomy in the 1980s.  She came to the emergency room

with abdominal distention and abdominal bloating.  No significant weight loss.  No

abdominal pain.  No nausea, vomiting.  She had a CT of the abdomen and pelvis done that

showed evidence of moderate amount of ascites, cirrhotic-appearing liver with a 5 cm

liver mass.  Also there was evidence of gastroesophageal varices and umbilical varices

noted.  The patient denies any prior history of chronic liver disease.  No history of

jaundice or hepatitis in the past.  She underwent large-volume paracentesis and 5.2 L

of fluid was removed today.  Cytology was requested, which is still pending.



PAST MEDICAL HISTORY:

Her past medical history is significant for arthritis, history of breast cancer in

1980, hypothyroidism, gastroesophageal reflux disease.



HOME MEDICATIONS:

[employer] p.r.n.



PAST SURGICAL HISTORY:

Cholecystectomy, hysterectomy and right mastectomy.



SOCIAL HISTORY:

No smoking. No alcohol use.



FAMILY HISTORY:

Unremarkable.



REVIEW OF SYSTEMS:

CARDIOPULMONARY: No chest pain or shortness of breath.

GENITOURINARY: No dysuria or hematuria.

MUSCULOSKELETAL: Unremarkable.

SKIN: Unremarkable.

ENDOCRINE: Unremarkable.

PSYCHIATRIC: Unremarkable.

NEUROLOGY: Unremarkable.

ENT/VISION: Unremarkable.

CONSTITUTIONAL: No recent weight loss. No fever, chills, night sweats.



PHYSICAL EXAMINATION:

She appears comfortable.  No apparent distress.

VITAL SIGNS:  Stable.  Blood pressure is 112/86, pulse rate 79, temperature 98.

HEENT examination unremarkable. Conjunctivae pink. Sclerae anicteric. Oral cavity no

lesions.

NECK: No JVD or lymph node enlargement.

CHEST: Clear to auscultation.

HEART:  Regular rate and rhythm.

ABDOMEN:  Soft.  Bowel sounds are positive.

EXTREMITIES: No pedal edema.

SKIN: No rashes.

NEUROLOGIC:  Alert and oriented x3.  No focal deficits.



LABS:

WBC 5.6, hemoglobin 11.4, platelets 142.  INR is 1.4.  AST and ALT are 103 and _____,

respectively. T-bilirubin is 1.9, alkaline phosphatase 115, albumin 2.5. Alpha

fetoprotein is 7.3.



IMPRESSION:

1. New-onset ascites in this patient with no history of chronic liver disease.  Her

    biochemistry parameters show mild elevation of serum transaminases with bilirubin

    up to 1.9 and thrombocytopenia, all of which are likely related to underlying

    chronic liver disease with possible underlying cirrhosis of the liver, etiology of

    which is unclear at this time.  The patient has no known history of chronic liver

    disease in the past.  No history of alcohol use. CT scan also showed nodular-

    appearing liver consistent with liver cirrhosis.

2. Mild elevation of serum transaminases and bilirubin up to 1.9, probably related to

    chronic underlying liver disease.

3. A 5 cm liver mass noted on CT scan of the abdomen. Oncology has been consulted.  I

    discussed with Dr. Andre.  MRI of the liver has been ordered.



RECOMMENDATIONS:

1. Await fluid cytology results.

2. Start her on Lasix 40 mg daily and Aldactone 50 mg daily.

3. Low-salt diet.

4. Await MRI of the liver results. Based on the results, we can consider a liver

    biopsy.  Will follow with you closely.

Thank you for this consultation.





MMLENNYL / CONCEPCIONN: [id number] / Job#: [id number]

## 2020-06-12 NOTE — US
EXAMINATION TYPE: US paracentesis abd w/image

 

DATE OF EXAM: 6/12/2020

 

COMPARISON: NONE

 

HISTORY: Ascites.

 

PROCEDURE:

Maximal barrier technique was utilized.  The skin overlying a suitable pocket of fluid was localized 
with ultrasound and the overlying skin was prepped and draped.  Ultrasound was utilized with sterile 
technique. Lidocaine was used for local anesthesia and a skin nick made with a scalpel. Catheter was 
advanced under direct ultrasound guidance into a suitable pocket of fluid and approximately 5.2 liter
s of serous fluid were removed.  Catheter was withdrawn and hemostasis achieved.  There is no immedia
te complication; the patient is discharged in stable condition. 

 

IMPRESSION:  STATUS POST ULTRASOUND GUIDED PARACENTESIS FOR PALLIATION OF ASCITES.  THIS PROCEDURE WA
S PERFORMED BY THE UNDERSIGNED.

## 2020-06-12 NOTE — P.PN
Progress Note - Text


Progress Note Date: 06/12/20





Patient admitted for abdominal distention with ascites in the setting of likely 

primary liver malignancy.


CT abdomen and pelvis showing moderate abdominal ascites, irregular liver 

margins, chronic portal venous hypertension, mixed density mass in the liver 

suspicious for primary malignancy.


Paracentesis was performed earlier today.  Cultures are pending.


Patient reports significant improvement in her abdominal pain after 

paracentesis.


AFP negative.


GI and oncology consultation pending.

## 2020-06-13 LAB
ALBUMIN SERPL-MCNC: 2.5 G/DL (ref 3.5–5)
ALP SERPL-CCNC: 112 U/L (ref 38–126)
ALT SERPL-CCNC: 16 U/L (ref 4–34)
ANION GAP SERPL CALC-SCNC: 4 MMOL/L
APTT BLD: 27.3 SEC (ref 22–30)
AST SERPL-CCNC: 96 U/L (ref 14–36)
BASOPHILS # BLD AUTO: 0 K/UL (ref 0–0.2)
BASOPHILS NFR BLD AUTO: 0 %
BUN SERPL-SCNC: 8 MG/DL (ref 7–17)
CALCIUM SPEC-MCNC: 8.5 MG/DL (ref 8.4–10.2)
CHLORIDE SERPL-SCNC: 107 MMOL/L (ref 98–107)
CO2 SERPL-SCNC: 25 MMOL/L (ref 22–30)
EOSINOPHIL # BLD AUTO: 0.1 K/UL (ref 0–0.7)
EOSINOPHIL NFR BLD AUTO: 2 %
ERYTHROCYTE [DISTWIDTH] IN BLOOD BY AUTOMATED COUNT: 3.79 M/UL (ref 3.8–5.4)
ERYTHROCYTE [DISTWIDTH] IN BLOOD: 15.7 % (ref 11.5–15.5)
GLUCOSE FLD-MCNC: 86 MG/DL
GLUCOSE SERPL-MCNC: 98 MG/DL (ref 74–99)
HCT VFR BLD AUTO: 36.1 % (ref 34–46)
HGB BLD-MCNC: 11.5 GM/DL (ref 11.4–16)
INR PPP: 1.4 (ref ?–1.2)
LYMPHOCYTES # SPEC AUTO: 0.7 K/UL (ref 1–4.8)
LYMPHOCYTES NFR SPEC AUTO: 15 %
MCH RBC QN AUTO: 30.4 PG (ref 25–35)
MCHC RBC AUTO-ENTMCNC: 31.9 G/DL (ref 31–37)
MCV RBC AUTO: 95.3 FL (ref 80–100)
MONOCYTES # BLD AUTO: 0.3 K/UL (ref 0–1)
MONOCYTES NFR BLD AUTO: 6 %
NEUTROPHILS # BLD AUTO: 3.7 K/UL (ref 1.3–7.7)
NEUTROPHILS NFR BLD AUTO: 77 %
PLATELET # BLD AUTO: 146 K/UL (ref 150–450)
POTASSIUM SERPL-SCNC: 3.5 MMOL/L (ref 3.5–5.1)
PROT SERPL-MCNC: 6.2 G/DL (ref 6.3–8.2)
PT BLD: 14 SEC (ref 9–12)
SODIUM SERPL-SCNC: 136 MMOL/L (ref 137–145)
WBC # BLD AUTO: 4.9 K/UL (ref 3.8–10.6)

## 2020-06-13 RX ADMIN — CEFAZOLIN SCH: 330 INJECTION, POWDER, FOR SOLUTION INTRAMUSCULAR; INTRAVENOUS at 00:47

## 2020-06-13 RX ADMIN — FUROSEMIDE SCH MG: 40 TABLET ORAL at 08:38

## 2020-06-13 RX ADMIN — LEVOTHYROXINE SODIUM SCH MCG: 75 TABLET ORAL at 06:10

## 2020-06-13 RX ADMIN — SPIRONOLACTONE SCH MG: 25 TABLET, FILM COATED ORAL at 08:38

## 2020-06-13 NOTE — P.PN
Subjective


Progress Note Date: 06/13/20


Principal diagnosis: 





Abdominal distention, liver mass





Patient was seen and examined.  No acute events overnight.  Patient reports 

considerable improvement in her abdominal pain and distention after 

paracentesis.  She denies any chest pain, shortness of breath or palpitations.  

No nausea or vomiting.  No fever or chills.





Objective





- Vital Signs


Vital signs: 


                                   Vital Signs











Temp  98.3 F   06/13/20 11:48


 


Pulse  89   06/13/20 11:48


 


Resp  17   06/13/20 11:48


 


BP  106/58   06/13/20 11:48


 


Pulse Ox  94 L  06/13/20 11:48








                                 Intake & Output











 06/12/20 06/13/20 06/13/20





 18:59 06:59 18:59


 


Intake Total 160  160


 


Balance 160  160


 


Intake:   


 


  Intake, IV Titration 160  160





  Amount   


 


    Sodium Chloride 0.9% 1, 160  160





    000 ml @ 20 mls/hr IV .   





    Q24H [facility] Rx#:[id number]   


 


Other:   


 


  Voiding Method  Toilet Toilet


 


  # Voids 1 2 














- Exam





General: [non toxic], [no distress], [appears at stated age]


Derm: [warm], [dry]


Head: [atraumatic], [normocephalic], [symmetric]


Eyes: [EOMI], [no lid lag], [anicteric sclera]


Mouth: [no lip lesion], [mucus membranes moist]


Cardiovascular: [S1S2 reg], [no murmur], [positive DP pulse bilateral], 


Lungs: [CTA bilateral], [no rhonchi, no rales] , [no accessory muscle use]


Abdominal: [soft], [nontender to palpation, slightly distended], [no guarding], 

[no appreciable organomegaly]


Ext: [no gross muscle atrophy], [no edema], [no contractures]


Neuro:  [no focal neuro deficits]


Psych: [Alert], [oriented], [appropriate affect] 





- Labs


CBC & Chem 7: 


                                 06/13/20 07:39





                                 06/13/20 07:39


Labs: 


                  Abnormal Lab Results - Last 24 Hours (Table)











  06/13/20 06/13/20 06/13/20 Range/Units





  07:39 07:39 07:39 


 


RBC   3.79 L   (3.80-5.40)  m/uL


 


RDW   15.7 H   (11.5-15.5)  %


 


Plt Count   146 L   (150-450)  k/uL


 


Lymphocytes #   0.7 L   (1.0-4.8)  k/uL


 


PT    14.0 H  (9.0-12.0)  sec


 


INR    1.4 H  (<1.2)  


 


Sodium  136 L    (137-145)  mmol/L


 


AST  96 H    (14-36)  U/L


 


Total Protein  6.2 L    (6.3-8.2)  g/dL


 


Albumin  2.5 L    (3.5-5.0)  g/dL








                      Microbiology - Last 24 Hours (Table)











 06/13/20 03:08 Urine Culture - Preliminary





 Urine,Voided 


 


 06/12/20 11:30 Gram Stain - Preliminary





 Ascites Fluid Body Fluid Culture - Preliminary


 


 06/12/20 11:30 Anaerobic Culture - Preliminary





 Ascites Fluid 














Assessment and Plan


Assessment: 





Abdominal distention with ascites in setting of liver mass and portal 

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 


-Alpha-fetoprotein negative, CA-15-3 negative, CA-27-29 negative


-MRI of the liver pending


-Consult GI and oncology


-Consult interventional radiology for possible biopsy of liver mass


-Fluid restriction, Lasix and Aldactone as per GI recommendations 





Thrombocytopenia


-Likely secondary to ongoing malignancy


-Monitor for now





Supratherapeutic INR


-Likely secondary to ongoing malignancy





Abnormal urinalysis


-Patient with leukocyte esterase positive but denies any dysuria


-No indication for antibiotics at this time





Hypothyroidism


-Restart Synthroid





Depression


-Restart fluoxetine





DVT prophylaxis


-IPCDs





[Patient admitted for ascites found to have liver mass.  Ascites drained by IR. 

 Oncology and GI on board.  MRI liver pending.  She will likely need biopsy of 

this liver mass, IR consulted.  Plans on possible DC after biopsy hopefully on 

Monday.]

## 2020-06-13 NOTE — PN
PROGRESS NOTE



DATE OF SERVICE:

06/13/2020



Patient is an 85-year-old pleasant white female seen on followup today.  She underwent

large-volume paracentesis yesterday and 5 L of fluid was removed.  Cytology is still

pending.  Fluid analysis showed total protein of 1.4 g, fluid albumin was not done,

fluid cytology pending.  She was scheduled for an MRI of the liver that was done late

last night.  No results available at this time of dictation.  She denies any symptoms.



PHYSICAL EXAMINATION:

Appears comfortable, no apparent distress. Vital signs stable.  Blood pressure 106/53,

pulse rate 83, temperature 98.9.

HEENT examination unremarkable.  Conjunctivae pink. Sclerae anicteric.  Oral cavity, no

lesions.

NECK: No JVD or lymph node enlargement.

CHEST: Clear to auscultation.

HEART:  Regular rate and rhythm.

ABDOMEN: Soft.  Bowel sounds are positive.  No organomegaly.

EXTREMITIES: No pedal edema.

SKIN; No rashes.

NEUROLOGIC:  Alert and oriented x3.  No focal deficits.



LABS:

From today WBC 4.9, hemoglobin 11.5, platelets 146.  INR 1.4.  Basic metabolic panel is

within normal limits.  AST is 96, ALT 16.



IMPRESSION:

1. New onset ascites status post large-volume paracentesis yesterday.  Fluid analysis

    reveals no protein.  Cytology is still pending.  CT of the abdomen did show nodular

    appearing liver suspicious for liver cirrhosis.

2. Remote history of breast cancer.

3. 5 cm mass in the liver.  Alpha fetoprotein is normal.  Liver MRI was done

    yesterday, the results of which are still pending at the time of this dictation.



RECOMMENDATIONS:

1. Await results of liver MRI.

2. Await cytology results.

3. Continue with symptomatic and supportive care.

4. We will follow with you closely.

Thank you for this consultation.





MMODL / IJN: [id number] / Job#: [id number]

## 2020-06-14 RX ADMIN — SPIRONOLACTONE SCH MG: 25 TABLET, FILM COATED ORAL at 08:24

## 2020-06-14 RX ADMIN — LEVOTHYROXINE SODIUM SCH MCG: 75 TABLET ORAL at 05:44

## 2020-06-14 RX ADMIN — CEFAZOLIN SCH: 330 INJECTION, POWDER, FOR SOLUTION INTRAMUSCULAR; INTRAVENOUS at 00:58

## 2020-06-14 RX ADMIN — FUROSEMIDE SCH MG: 40 TABLET ORAL at 08:24

## 2020-06-14 NOTE — PN
PROGRESS NOTE



DATE OF DICTATION:

06/14/2020



Patient is an 85-year-old pleasant white female admitted to hospital with new onset

ascites and liver mass.  She had a large volume paracentesis done, cytology is still

pending.  She did have MRI of the liver done yesterday that showed a 6.2 x 6.1 cm

enhancing mass within the septation and another 2 cm mass at the center of this area

noted, findings suspicious for hepatoma.  Patient denies any symptoms.  Overall she is

doing well.  No abdominal pain.  No nausea, vomiting.



PHYSICAL EXAMINATION:

Appears comfortable in no apparent distress.  Vital signs stable.  Blood pressure is

112/64, pulse rate 81, temperature 98.7.

HEENT examination unremarkable. Conjunctivae pink.  Sclerae anicteric.  Oral cavity no

lesions.

NECK no JVD or lymph node enlargement.

CHEST was clear to auscultation.

HEART:  Regular rate and rhythm.

ABDOMEN:  Soft, bowel sounds are positive.  No organomegaly.

EXTREMITIES:  No pedal edema.

NEURO:  She is alert and oriented x3.  No focal deficits.



LABS:

WBC 4.9, hemoglobin 11.5, platelets 146.  INR 1.4, AST, ALT 96 and 16 respectively. T-

bilirubin and alkaline phosphatase are within normal limits.  CA-125 elevated at 436.

AFP is normal.



IMPRESSION:

1. New onset ascites status post large-volume paracentesis, fluid cytology pending.

    Questionable cirrhosis noted on imaging studies.

2. Elevated CA-125 at 430.

3. MRI of the liver revealed a 6 cm 6 x 4 cm mass and another 2 cm mass suspicious for

    hematoma.

4. Remote history of breast cancer.



RECOMMENDATIONS:

1. Recommendations from Oncology for ultrasound and liver biopsy of the mass.

2. Await cytology results from the abdominal ascites.

3. We will follow with you closely.

Thank you for this consultation.





MMODL / IJN: [id number] / Job#: [id number]

## 2020-06-14 NOTE — P.PN
Subjective


Progress Note Date: 06/14/20


Principal diagnosis: 





Abdominal distention, liver mass





Patient was seen and examined.  No acute events overnight.  Patient reports 

considerable improvement in her abdominal pain and distention after 

paracentesis.  She denies any chest pain, shortness of breath or palpitations.  

No nausea or vomiting.  No fever or chills. Plans for possible biopsy of liver 

mass tomorrow.





Objective





- Vital Signs


Vital signs: 


                                   Vital Signs











Temp  98.7 F   06/14/20 11:28


 


Pulse  81   06/14/20 11:28


 


Resp  17   06/14/20 11:28


 


BP  112/64   06/14/20 11:28


 


Pulse Ox  94 L  06/14/20 11:28








                                 Intake & Output











 06/13/20 06/14/20 06/14/20





 18:59 06:59 18:59


 


Intake Total 160  160


 


Balance 160  160


 


Intake:   


 


  Intake, IV Titration 160  160





  Amount   


 


    Sodium Chloride 0.9% 1, 160  160





    000 ml @ 20 mls/hr IV .   





    Q24H [facility] Rx#:[id number]   


 


Other:   


 


  Voiding Method Toilet Toilet Toilet


 


  # Voids 1 1 2


 


  # Bowel Movements 1  














- Exam





General: [non toxic], [no distress], [appears at stated age]


Derm: [warm], [dry]


Head: [atraumatic], [normocephalic], [symmetric]


Eyes: [EOMI], [no lid lag], [anicteric sclera]


Mouth: [no lip lesion], [mucus membranes moist]


Cardiovascular: [S1S2 reg], [no murmur], [positive DP pulse bilateral], 


Lungs: [CTA bilateral], [no rhonchi, no rales] , [no accessory muscle use]


Abdominal: [soft], [nontender to palpation, slightly distended], [no guarding], 

[no appreciable organomegaly]


Ext: [no gross muscle atrophy], [no edema], [no contractures]


Neuro:  [no focal neuro deficits]


Psych: [Alert], [oriented], [appropriate affect] 





- Labs


CBC & Chem 7: 


                                 06/13/20 07:39





                                 06/13/20 07:39


Labs: 


                  Abnormal Lab Results - Last 24 Hours (Table)











  06/13/20 Range/Units





  07:39 


 


 Antigen  436.4 H  (0.0-30.1)  U/mL








                      Microbiology - Last 24 Hours (Table)











 06/13/20 03:08 Urine Culture - Final





 Urine,Voided 


 


 06/12/20 11:30 Gram Stain - Preliminary





 Ascites Fluid Body Fluid Culture - Preliminary














Assessment and Plan


Assessment: 





Abdominal distention with ascites in setting of liver mass and portal 

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 


-Alpha-fetoprotein negative, CA-15-3 negative, CA-27-29 negative


-elevated CA-125 antigen


-MRI of the liver showing hepatoma


-Consult GI and oncology


-Consult interventional radiology for possible biopsy of liver mass


-Fluid restriction, Lasix and Aldactone as per GI recommendations 





Thrombocytopenia


-Likely secondary to ongoing malignancy


-Monitor for now





Supratherapeutic INR


-Likely secondary to ongoing malignancy





Abnormal urinalysis


-Patient with leukocyte esterase positive but denies any dysuria


-No indication for antibiotics at this time





Hypothyroidism


-Restart Synthroid





Depression


-Restart fluoxetine





DVT prophylaxis


-IPCDs





[Patient admitted for ascites found to have liver mass.  Ascites drained by IR. 

 Oncology and GI on board.  MRI liver showing hepatoma.  She will likely need 

biopsy of this liver mass, IR consulted.  Plans on possible DC after biopsy 

hopefully on Monday.]

## 2020-06-14 NOTE — P.PN
Subjective


Progress Note Date: 06/14/20


Principal diagnosis: 





Ascites and Liver Mass





Reviewed MRI liver and mass identified - Will ask IR to obtain sample for 

pathology





Objective





- Vital Signs


Vital signs: 


                                   Vital Signs











Temp  98.7 F   06/14/20 11:28


 


Pulse  81   06/14/20 11:28


 


Resp  17   06/14/20 11:28


 


BP  112/64   06/14/20 11:28


 


Pulse Ox  94 L  06/14/20 11:28








                                 Intake & Output











 06/13/20 06/14/20 06/14/20





 18:59 06:59 18:59


 


Intake Total 160  160


 


Balance 160  160


 


Intake:   


 


  Intake, IV Titration 160  160





  Amount   


 


    Sodium Chloride 0.9% 1, 160  160





    000 ml @ 20 mls/hr IV .   





    Q24H [facility] Rx#:[id number]   


 


Other:   


 


  Voiding Method Toilet Toilet Toilet


 


  # Voids 1 1 2


 


  # Bowel Movements 1  














- Exam





- Constitutional


General appearance: cooperative, no acute distress





- EENT


Eyes: EOMI, dentition normal


ENT: NA/AT, normal oropharynx





- Neck





Supple


Neck: normal ROM





- Respiratory


Respiratory: bilateral: diminished (lower lobes)





- Cardiovascular


Rhythm: regular


Heart sounds: normal: S1, S2





- Gastrointestinal


General gastrointestinal: distended, soft





- Integumentary


Integumentary: pale





- Neurologic





non-focal





- Musculoskeletal


Musculoskeletal: generalized weakness





- Psychiatric


Psychiatric: A&O x's 3, appropriate affect, intact judgment & insight








- Labs


CBC & Chem 7: 


                                 06/13/20 07:39





                                 06/13/20 07:39


Labs: 


                  Abnormal Lab Results - Last 24 Hours (Table)











  06/13/20 Range/Units





  07:39 


 


 Antigen  436.4 H  (0.0-30.1)  U/mL








                      Microbiology - Last 24 Hours (Table)











 06/13/20 03:08 Urine Culture - Final





 Urine,Voided 


 


 06/12/20 11:30 Gram Stain - Preliminary





 Ascites Fluid Body Fluid Culture - Preliminary














Assessment and Plan


Plan: 





Assessment and Recommendations:





New Liver Abnormality:


 - Review of MRI of liver revealed 6cm mass, PLaced consult to obtain biopsy





Abdominal Ascites:


 - Acute Onset:


 - Status POst Paracentesis


 - Await Cytology - Still pending





Remote History Right Breast Cancer:


 - CHemo and Mastectomy


 - She was in her 40s, and two sisters with breast cancer under 50 as well 





Spoke to Patients Daughter Tuyet and updated on plan

## 2020-06-14 NOTE — MR
MR liver with and without contrast

 

HISTORY: Liver mass

 

Multiplanar multisequence and postcontrast images obtained through the abdomen following 6.5 cc Gadav
ist IV

 

Correlation to CT scan 6/11/2020

 

There is ascites. The liver shows nodular contour consistent with underlying cirrhosis. The mass seen
 within the right lobe of the liver is relatively isointense on T1 and T2-weighted images. Some small
 areas of T2 bright signal are present which correlate with the low attenuation on CT, somewhat brigh
t on T1-weighted images. Postcontrast images show a peripherally enhancing mass with internal septati
ons, central low signal, the mass measures approximately 6.2 x 6.1 x 4.7 cm. Immediately adjacent tow
nasrin the dome posteriorly there is an area of enhancement present showing washout and measuring approx
imately 2 cm. Focus of low-attenuation seen on CT within the left lobe is T2 bright, no definite enha
ncement and only measures 11 mm, is likely representative of a cyst.

 

There is no evident retroperitoneal adenopathy. No pancreatic mass. Patient is post cholecystectomy. 
The spleen is not enlarged. 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 Aorta shows normal caliber. Infe
rior vena cava is normal.

 

IMPRESSION: Findings could represent hepatoma.

## 2020-06-15 PROCEDURE — 0FB13ZX EXCISION OF RIGHT LOBE LIVER, PERCUTANEOUS APPROACH, DIAGNOSTIC: ICD-10-PCS

## 2020-06-15 PROCEDURE — 0W9G3ZZ DRAINAGE OF PERITONEAL CAVITY, PERCUTANEOUS APPROACH: ICD-10-PCS

## 2020-06-15 RX ADMIN — CEFAZOLIN SCH: 330 INJECTION, POWDER, FOR SOLUTION INTRAMUSCULAR; INTRAVENOUS at 01:47

## 2020-06-15 RX ADMIN — CEFAZOLIN SCH: 330 INJECTION, POWDER, FOR SOLUTION INTRAMUSCULAR; INTRAVENOUS at 21:18

## 2020-06-15 RX ADMIN — SPIRONOLACTONE SCH MG: 25 TABLET, FILM COATED ORAL at 08:06

## 2020-06-15 RX ADMIN — FUROSEMIDE SCH MG: 40 TABLET ORAL at 08:06

## 2020-06-15 RX ADMIN — LEVOTHYROXINE SODIUM SCH: 75 TABLET ORAL at 05:02

## 2020-06-15 NOTE — US
EXAMINATION TYPE: US paracentesis abd w/image

 

DATE OF EXAM: 6/15/2020

 

COMPARISON: NONE

 

HISTORY: Ascites.

 

PROCEDURE:

Maximal barrier technique was utilized.  The skin overlying a suitable pocket of fluid was localized 
with ultrasound and the overlying skin was prepped and draped.  Ultrasound was utilized with sterile 
technique. Lidocaine was used for local anesthesia and a skin nick made with a scalpel. Catheter was 
advanced under direct ultrasound guidance into a suitable pocket of fluid and approximately 4.1 liter
s of serous fluid were removed.  Catheter was withdrawn and hemostasis achieved.  There is no immedia
te complication; the patient is discharged in stable condition. 

 

IMPRESSION:  STATUS POST ULTRASOUND GUIDED PARACENTESIS FOR PREPARATION OF LIVER BIOPSY OF ASCITES.  
THIS PROCEDURE WAS PERFORMED BY THE UNDERSIGNED.

## 2020-06-15 NOTE — P.PCN
Date of Procedure: 06/15/20


Preoperative Diagnosis: 


liver mass





Procedure(s) Performed: 


u/s guide liver core biopsy and paracentesis


Anesthesia: local, other


Estimated Blood Loss (ml): 5


Condition: stable


Disposition: no change


Operative Findings: 


3.5 Liters serous fluid, single pass right lobe liver mass, 18 ga core to path

## 2020-06-15 NOTE — US
EXAMINATION TYPE: US biopsy liver

 

DATE OF EXAM: 6/15/2020

 

HISTORY: Liver mass, cirrhosis, history of breast cancer.

 

FINDINGS: Maximal barrier technique was utilized.  Hand hygiene achieved with soap and water. The ski
n overlying a suitable path to the patient's mass in the right lobe of liver was localized with ultra
sound and the overlying skin prepped and draped.  Ultrasound was utilized with sterile technique. Lid
ocaine was  used for local anesthesia.  A skin nick was made with a scalpel.  An 18-gauge needle was 
advanced under direct ultrasound guidance and core specimen obtained of the mass. Specimen submitted 
in formalin to Pathology.  Following the procedure, hemostasis achieved and the patient is discharged
 in stable condition without complication back to the floor.

 

Impression: status POST ULTRASOUND GUIDED CORE BIOPSY OF right lobe liver MASS, PATHOLOGY IS PENDING.
  THIS PROCEDURE IS PERFORMED BY THE UNDERSIGNED.

## 2020-06-15 NOTE — P.PN
Subjective


Progress Note Date: 06/15/20


Principal diagnosis: 





Ascites and Liver Mass





Planning for Liver Biopsy with IR today. COntinue to wait for cytology from 

Paracentesis last week. Patient is comfortable. Abdomen does feel a little more 

distended today. 





Objective





- Vital Signs


Vital signs: 


                                   Vital Signs











Temp  98.0 F   06/15/20 05:00


 


Pulse  78   06/15/20 05:00


 


Resp  18   06/15/20 05:00


 


BP  103/57   06/15/20 05:00


 


Pulse Ox  94 L  06/15/20 05:00








                                 Intake & Output











 06/14/20 06/15/20 06/15/20





 18:59 06:59 18:59


 


Intake Total 160  


 


Balance 160  


 


Intake:   


 


  Intake, IV Titration 160  





  Amount   


 


    Sodium Chloride 0.9% 1, 160  





    000 ml @ 20 mls/hr IV .   





    Q24H [facility] Rx#:[id number]   


 


Other:   


 


  Voiding Method Toilet Toilet 


 


  # Voids 2 1 














- Exam





- Constitutional


General appearance: cooperative, no acute distress





- EENT


Eyes: EOMI, dentition normal


ENT: NA/AT, normal oropharynx





- Neck





Supple


Neck: normal ROM





- Respiratory


Respiratory: bilateral: diminished (lower lobes)





- Cardiovascular


Rhythm: regular


Heart sounds: normal: S1, S2





- Gastrointestinal


General gastrointestinal: distended, soft





- Integumentary


Integumentary: pale





- Neurologic





non-focal





- Musculoskeletal


Musculoskeletal: generalized weakness





- Psychiatric


Psychiatric: A&O x's 3, appropriate affect, intact judgment & insight








- Labs


CBC & Chem 7: 


                                 06/13/20 07:39





                                 06/13/20 07:39


Labs: 


                      Microbiology - Last 24 Hours (Table)











 06/12/20 11:30 Gram Stain - Preliminary





 Ascites Fluid Body Fluid Culture - Preliminary


 


 06/12/20 11:30 Anaerobic Culture - Preliminary





 Ascites Fluid 


 


 06/13/20 03:08 Urine Culture - Final





 Urine,Voided 














Assessment and Plan


Plan: 





Assessment and Recommendations:





New Liver Abnormality:


 - Review of MRI of liver revealed 6cm mass,PLan for IR to obtain biopsy today. 





Abdominal Ascites:


 - Acute Onset: 2-3 week interval


 - Status Post Paracentesis


 - Await Cytology - Still pending





Remote History Right Breast Cancer:


 - Treated with Chemo and Mastectomy


 - She was in her 40s, and two sisters with breast cancer under 50 as well 





Plan:


 - Biopsy of Liver Lesion with IR Today


 - COntinue to wait for results of Cytology and path from liver biopsy to obtain

definitive diagnosis and set treatment plan options


 - PT/OT





Have remained in contact with patients daughter Tuyet





Physician ATTEST: I have completed the full history and physical and agree with 

above dictation. Dictated as a scribe

## 2020-06-16 VITALS
SYSTOLIC BLOOD PRESSURE: 104 MMHG | HEART RATE: 85 BPM | TEMPERATURE: 98.7 F | RESPIRATION RATE: 18 BRPM | DIASTOLIC BLOOD PRESSURE: 56 MMHG

## 2020-06-16 LAB
ERYTHROCYTE [DISTWIDTH] IN BLOOD BY AUTOMATED COUNT: 3.63 M/UL (ref 3.8–5.4)
ERYTHROCYTE [DISTWIDTH] IN BLOOD: 15.8 % (ref 11.5–15.5)
HCT VFR BLD AUTO: 34.7 % (ref 34–46)
HGB BLD-MCNC: 10.8 GM/DL (ref 11.4–16)
MCH RBC QN AUTO: 29.8 PG (ref 25–35)
MCHC RBC AUTO-ENTMCNC: 31.2 G/DL (ref 31–37)
MCV RBC AUTO: 95.5 FL (ref 80–100)
PLATELET # BLD AUTO: 106 K/UL (ref 150–450)
WBC # BLD AUTO: 6.6 K/UL (ref 3.8–10.6)

## 2020-06-16 RX ADMIN — SPIRONOLACTONE SCH MG: 25 TABLET, FILM COATED ORAL at 08:17

## 2020-06-16 RX ADMIN — LEVOTHYROXINE SODIUM SCH MCG: 75 TABLET ORAL at 05:24

## 2020-06-16 RX ADMIN — FUROSEMIDE SCH MG: 40 TABLET ORAL at 08:17

## 2020-06-16 NOTE — P.PN
Subjective


Progress Note Date: 06/16/20


Principal diagnosis: 





Ascites and Liver Mass





Status post biopsy of liver. Her Ca125 is elevated. Patient has had total 

hysterectomy many years prior. there is also strong family hx of breast 

(including self) and ovarian cancer/endometrial cancers in family. Her platelets

are decreased today. although in safe range. I have discussed genetic testing 

counseling with patient and daughter. Also, discussed standing order for 

paracentesis and follow-up visit plan. They are agreeable and state 

understanding. We will have patient evaluate [facility] home care nurse for possibility

of PT/OT and safety evaluation for medical equipment in home. 





Objective





- Vital Signs


Vital signs: 


                                   Vital Signs











Temp  98.7 F   06/16/20 05:00


 


Pulse  85   06/16/20 05:00


 


Resp  18   06/16/20 05:00


 


BP  104/56   06/16/20 05:00


 


Pulse Ox  95   06/16/20 05:00








                                 Intake & Output











 06/16/20 06/16/20 06/17/20





 06:59 18:59 06:59


 


Intake Total 840  


 


Balance 840  


 


Intake:   


 


  Oral 840  


 


Other:   


 


  Voiding Method Toilet Toilet 


 


  # Voids 1  


 


  # Bowel Movements 1  














- Exam





- Constitutional


General appearance: cooperative, no acute distress





- EENT


Eyes: EOMI, dentition normal


ENT: NA/AT, normal oropharynx





- Neck





Supple


Neck: normal ROM





- Respiratory


Respiratory: bilateral: diminished (lower lobes)





- Cardiovascular


Rhythm: regular


Heart sounds: normal: S1, S2





- Gastrointestinal


General gastrointestinal: distended, soft





- Integumentary


Integumentary: pale





- Neurologic





non-focal





- Musculoskeletal


Musculoskeletal: generalized weakness





- Psychiatric


Psychiatric: A&O x's 3, appropriate affect, intact judgment & insight








- Labs


CBC & Chem 7: 


                                 06/16/20 06:09





                                 06/13/20 07:39


Labs: 


                  Abnormal Lab Results - Last 24 Hours (Table)











  06/16/20 Range/Units





  06:09 


 


RBC  3.63 L  (3.80-5.40)  m/uL


 


Hgb  10.8 L  (11.4-16.0)  gm/dL


 


RDW  15.8 H  (11.5-15.5)  %


 


Plt Count  106 L  (150-450)  k/uL








                      Microbiology - Last 24 Hours (Table)











 06/12/20 11:30 Anaerobic Culture - Final





 Ascites Fluid 


 


 06/12/20 11:30 Gram Stain - Final





 Ascites Fluid Body Fluid Culture - Final














Assessment and Plan


Plan: 





Assessment and Recommendations:





New Liver Abnormality:


 - Review of MRI of liver revealed 6cm mass,PLan for IR to obtain biopsy today. 





Abdominal Ascites:


 - Acute Onset: 2-3 week interval


 - Status Post Paracentesis


 - Await Cytology - Still pending





Remote History Right Breast Cancer:


 - Treated with Chemo and Mastectomy


 - She was in her 40s, and two sisters with breast cancer under 50 as well 





Plan:


 - Status Post Biopsy of Liver Lesion with IR 


 - Continue to wait for results of Cytology and path from liver biopsy to obtain

definitive diagnosis and set treatment plan options


 - PT/OT and home nurse evaluation at discharge


 - Will send for standing order to IR for paracentesis


 - Genetic testing and family counseling is recommended for further treatment 

options


 -  increased picture consistent with GYN metastatic malignancy

## 2020-06-16 NOTE — P.PN
Subjective


Progress Note Date: 05/15/20


Principal diagnosis: 





Abdominal distention, liver mass





This is a late note entry.  This patient was seen and examined on 06/15/2020.





Patient was seen and examined after her biopsy and paracentesis.  Patient 

reports no complaints after the procedure.  States that she is hungry as she has

been nothing by mouth throughout the day.  She denies any chest pain, shortness 

of breath or palpitations.  No nausea or vomiting.  No fever or chills.





Objective





- Vital Signs


Vital signs: 


                                   Vital Signs











Temp  98.7 F   06/16/20 05:00


 


Pulse  85   06/16/20 05:00


 


Resp  18   06/16/20 05:00


 


BP  104/56   06/16/20 05:00


 


Pulse Ox  95   06/16/20 05:00








                                 Intake & Output











 06/15/20 06/16/20 06/16/20





 18:59 06:59 18:59


 


Intake Total 0 840 


 


Balance 0 840 


 


Intake:   


 


  Oral 0 840 


 


Other:   


 


  Voiding Method  Toilet Toilet


 


  # Voids 1 1 


 


  # Bowel Movements  1 














- Exam





General: [non toxic], [no distress], [appears at stated age]


Derm: [warm], [dry]


Head: [atraumatic], [normocephalic], [symmetric]


Eyes: [EOMI], [no lid lag], [anicteric sclera]


Mouth: [no lip lesion], [mucus membranes moist]


Cardiovascular: [S1S2 reg], [no murmur], [positive DP pulse bilateral], 


Lungs: [CTA bilateral], [no rhonchi, no rales] , [no accessory muscle use]


Abdominal: [soft], [nontender to palpation, slightly distended], [no guarding], 

[no appreciable organomegaly]


Ext: [no gross muscle atrophy], [no edema], [no contractures]


Neuro:  [no focal neuro deficits]


Psych: [Alert], [oriented], [appropriate affect] 





- Labs


CBC & Chem 7: 


                                 06/16/20 06:09





                                 06/13/20 07:39


Labs: 


                  Abnormal Lab Results - Last 24 Hours (Table)











  06/16/20 Range/Units





  06:09 


 


RBC  3.63 L  (3.80-5.40)  m/uL


 


Hgb  10.8 L  (11.4-16.0)  gm/dL


 


RDW  15.8 H  (11.5-15.5)  %


 


Plt Count  106 L  (150-450)  k/uL














Assessment and Plan


Assessment: 





Abdominal distention with ascites in setting of liver mass and portal 

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 


-Alpha-fetoprotein negative, CA-15-3 negative, CA-27-29 negative


-elevated CA-125 antigen


-MRI of the liver showing hepatoma


-Consult GI and oncology


-Status post paracentesis and biopsy of liver mass


-Fluid restriction, Lasix and Aldactone as per GI recommendations 





Thrombocytopenia


-Likely secondary to ongoing malignancy


-Monitor for now





Supratherapeutic INR


-Likely secondary to ongoing malignancy





Abnormal urinalysis


-Patient with leukocyte esterase positive but denies any dysuria


-No indication for antibiotics at this time





Hypothyroidism


-Restart Synthroid





Depression


-Restart fluoxetine





DVT prophylaxis


-IPCDs





[Patient admitted for ascites found to have liver mass.  IR was able to obtain 

liver biopsy and repeat paracentesis.  We will observe her overnight and repeat 

CBC tomorrow morning.  Plans on DC home tomorrow if patient continues to 

progress well and hemoglobin remained stable.]

## 2020-06-16 NOTE — P.DS
Providers


Date of admission: 


06/11/20 23:19





Expected date of discharge: 06/16/20


Attending physician: 


Dany Banks MD





Consults: 





                                        





06/11/20 23:18


Consult Physician Routine 


   Consulting Provider: Shirley Luna


   Consult Reason/Comments: Liver lesions, concern for malignancy


   Do you want consulting provider notified?: Yes





06/11/20 23:21


Consult Physician Routine 


   Consulting Provider: Dominick Andre


   Consult Reason/Comments: Concern malignant liver leson, consult per sound 

request


   Do you want consulting provider notified?: Yes, Notify in am











Primary care physician: 


Physician Nonstaff





Hospital Course: 





The patient is an 85-year-old female with a PMH of hypothyroidism, and history 

of breast cancer status post right mastectomy (1980s) who presented to the ED 

with complaints of abdominal distention and bloating.  The patient notes that 

her symptoms started acutely 2 weeks ago and have gradually worsened to the 

point where she now has difficulty sleeping due to her distention and has been 

using a walker as she has been getting progressively weaker during this time.  

She however denied overt abdominal pain, nausea, vomiting, or diarrhea.  She 

endorsed regular bowel movements with occasional constipation.  The patient also

reported anorexia over the past few months with decreased appetite and some 

weight loss.  She otherwise denied any additional complaints including chest 

pain, shortness of breath, fever, chills, cough, dizziness, or bleeding.  She 

underwent an extensive evaluation in the emergency room with CT abdomen and 

pelvis with contrast showing moderate abdominal ascites with irregular liver 

margin consistent with cirrhosis along with gastroesophageal varices and 

umbilical varices consistent with portal hypertension.  A mixed density mass in 

the liver suspicious for primary malignancy was also noted.  EKG had revealed a 

normal sinus rhythm at 77 bpm with no ST/T-wave changes noted as reviewed by me.

 Laboratory evaluation revealed an obese, 5.6, hemoglobin 11.4, platelets 142, 

sodium 137, potassium 3.6, chloride 108, CO2 21, BUN 10, creatinine 0.56, total 

bilirubin 1.9, , ALT 17, alkaline phosphatase elevated at 134, troponin 

less than 0.012, and albumin low at 3.0.





Patient underwent diagnostic and therapeutic paracentesis.  Interventional 

radiology performed the procedure.  Oncology was consulted.  Alpha-fetoprotein 

was negative.  CEA was negative.  CA-15-3 and CA-27-29 was within normal limits.

  was elevated.  Her SAAG score was 1.1.  Ascitic fluid culture was 

preliminarily negative at the time of discharge.  Urine culture was negative.  

Oncology was consulted and recommended IR biopsy of the liver mass.  She 

underwent repeat paracentesis and biopsy of her liver mass.  Her hemoglobin one 

day after the procedure was 10.8.  There is no signs of bleeding.





Patient was seen and examined.  No acute events overnight.  Patient reports no 

complaints.  She reports improvement in her abdominal distention and pain.  She 

denies any chest pain, shortness of breath or palpitations.  No nausea or 

vomiting.  No fever or chills.  Wanting to go home today.





General: [non toxic], [no distress], [appears at stated age]


Derm: [warm], [dry]


Head: [atraumatic], [normocephalic], [symmetric]


Eyes: [EOMI], [no lid lag], [anicteric sclera]


Mouth: [no lip lesion], [mucus membranes moist]


Cardiovascular: [S1S2 reg], [no murmur], [positive DP pulse bilateral], 


Lungs: [CTA bilateral], [no rhonchi, no rales] , [no accessory muscle use]


Abdominal: [soft], [distended with Band-Aid over the right abdomen appears clean

 dry and intact and nontender], [no guarding], [no appreciable organomegaly]


Ext: [no gross muscle atrophy], [no edema], [no contractures]


Neuro:  [no focal neuro deficits]


Psych: [Alert], [oriented], [appropriate affect] 





Abdominal distention with ascites in setting of liver mass and portal 

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 


-Alpha-fetoprotein negative, CA-15-3 negative, CA-27-29 negative


-elevated CA-125 antigen


-MRI of the liver showing hepatoma


-Consult GI and oncology


-Patient underwent biopsy of her liver mass to be followed up with oncology in 

the outpatient setting


-Fluid restriction, Lasix and Aldactone as per GI recommendations 





Thrombocytopenia


-Likely secondary to ongoing malignancy


-Monitor for now





Supratherapeutic INR


-Likely secondary to ongoing malignancy





Abnormal urinalysis


-Patient with leukocyte esterase positive but denies any dysuria


-No indication for antibiotics at this time





Hypothyroidism


-Restart Synthroid





Depression


-Restart fluoxetine





DVT prophylaxis


-IPCDs





[Patient admitted for ascites found to have liver mass.  Ascites drained by IR. 

 Oncology and GI on board.  MRI liver showing hepatoma.  Underwent biopsy and 

repeat paracentesis by IR.  Plans on possible DC home today with follow-up with 

PCP within 3 days and oncology within 1 week for results of the liver biopsy.  

Patient verbalized understanding of the plan. This complex discharge took about 

35 minute to complete.]


Pertinent Studies: 





CT abdomen and pelvis, KUB, chest x-ray, abdominal ultrasound, liver MRI


Procedures: 





Paracentesis and liver biopsy


Patient Condition at Discharge: Stable





Plan - Discharge Summary


New Discharge Prescriptions: 


New


   Spironolactone [Aldactone] 50 mg PO DAILY #60 tab


   Furosemide [Lasix] 40 mg PO DAILY #30 tab





Continue


   HYDROcodone/APAP 5-325MG [Norco 5-325] 1 tab PO Q6HR PRN #10 tab


     PRN Reason: Pain


   Benzonatate [Tessalon Perles] 100 mg PO HS


   Levothyroxine Sodium [Synthroid] 150 mcg PO 0600


   Cholecalciferol [Vitamin D3 (25 Mcg = 1000 Iu)] 1,000 unit PO DAILY


   FLUoxetine HCL 40 mg PO DAILY


   Pantoprazole [Protonix] 40 mg PO DAILY PRN


     PRN Reason: Indigestion


Discharge Medication List





HYDROcodone/APAP 5-325MG [Norco 5-325] 1 tab PO Q6HR PRN #10 tab 05/18/20 [Rx]


Benzonatate [Tessalon Perles] 100 mg PO HS 06/12/20 [History]


Cholecalciferol [Vitamin D3 (25 Mcg = 1000 Iu)] 1,000 unit PO DAILY 06/12/20 

[History]


FLUoxetine HCL 40 mg PO DAILY 06/12/20 [History]


Levothyroxine Sodium [Synthroid] 150 mcg PO 0600 06/12/20 [History]


Pantoprazole [Protonix] 40 mg PO DAILY PRN 06/12/20 [History]


Furosemide [Lasix] 40 mg PO DAILY #30 tab 06/16/20 [Rx]


Spironolactone [Aldactone] 50 mg PO DAILY #60 tab 06/16/20 [Rx]








Follow up Appointment(s)/Referral(s): 


Nonstaff,Physician [Primary Care Provider] - 1-2 days


Dominick Andre MD [STAFF PHYSICIAN] - 1 Week


Shirley Luna MD [STAFF PHYSICIAN] - 1 Week


Activity/Diet/Wound Care/Special Instructions: 


Diet: Low-salt


Follow-up PCP within 3 days of discharge.


Follow-up with oncology within 1 week of discharge.


Follow-up with GI within 1 week of discharge.


Please follow-up with the results of the liver biopsy and results of 

paracentesis cytology with oncology.


Take all medications as advised.


Come back to the ED or call 911 for worsening chest pain, shortness of breath, 

palpitations or dizziness.  Come back to the ED or call 911 for worsening 

abdominal pain, fevers greater than 101.4 Fahrenheit.


Discharge Disposition: HOME SELF-CARE

## 2020-06-23 ENCOUNTER — HOSPITAL ENCOUNTER (OUTPATIENT)
Dept: HOSPITAL 47 - RADPROMAIN | Age: 85
Discharge: HOME | End: 2020-06-23
Attending: INTERNAL MEDICINE
Payer: MEDICARE

## 2020-06-23 VITALS
SYSTOLIC BLOOD PRESSURE: 115 MMHG | TEMPERATURE: 97.8 F | DIASTOLIC BLOOD PRESSURE: 56 MMHG | HEART RATE: 87 BPM | RESPIRATION RATE: 16 BRPM

## 2020-06-23 DIAGNOSIS — Z53.8: ICD-10-CM

## 2020-06-23 DIAGNOSIS — R18.8: Primary | ICD-10-CM

## 2020-06-23 LAB
INR PPP: 1.3 (ref ?–1.2)
PLATELET # BLD AUTO: 163 K/UL (ref 150–450)
PT BLD: 13.1 SEC (ref 9–12)

## 2020-06-23 PROCEDURE — 76705 ECHO EXAM OF ABDOMEN: CPT

## 2020-06-23 PROCEDURE — 85610 PROTHROMBIN TIME: CPT

## 2020-06-23 PROCEDURE — 36415 COLL VENOUS BLD VENIPUNCTURE: CPT

## 2020-06-23 PROCEDURE — 85049 AUTOMATED PLATELET COUNT: CPT

## 2020-06-23 NOTE — US
Ultrasound-guided paracentesis.

 

DATE OF EXAM: 6/23/2020

 

CLINICAL HISTORY:  Ascites

 

Preliminary imaging demonstrated only a small amount of fluid. The patient wished to defer the proced
ure.

 

IMPRESSION: 

Patient deferred paracentesis due to small amount of fluid.

## 2020-07-01 ENCOUNTER — HOSPITAL ENCOUNTER (OUTPATIENT)
Dept: HOSPITAL 47 - RADPROMAIN | Age: 85
Discharge: HOME | End: 2020-07-01
Attending: INTERNAL MEDICINE
Payer: MEDICARE

## 2020-07-01 VITALS — RESPIRATION RATE: 16 BRPM | SYSTOLIC BLOOD PRESSURE: 114 MMHG | HEART RATE: 79 BPM | DIASTOLIC BLOOD PRESSURE: 58 MMHG

## 2020-07-01 VITALS — TEMPERATURE: 98.1 F

## 2020-07-01 DIAGNOSIS — R18.8: Primary | ICD-10-CM

## 2020-07-01 DIAGNOSIS — Z88.0: ICD-10-CM

## 2020-07-01 DIAGNOSIS — Z88.5: ICD-10-CM

## 2020-07-01 LAB
INR PPP: 1.4 (ref ?–1.2)
PLATELET # BLD AUTO: 164 K/UL (ref 150–450)
PT BLD: 13.5 SEC (ref 9–12)

## 2020-07-01 PROCEDURE — 49083 ABD PARACENTESIS W/IMAGING: CPT

## 2020-07-01 PROCEDURE — 85049 AUTOMATED PLATELET COUNT: CPT

## 2020-07-01 PROCEDURE — 85610 PROTHROMBIN TIME: CPT

## 2020-07-01 PROCEDURE — 36415 COLL VENOUS BLD VENIPUNCTURE: CPT

## 2020-07-14 ENCOUNTER — HOSPITAL ENCOUNTER (OUTPATIENT)
Dept: HOSPITAL 47 - RADCTMAIN | Age: 85
Discharge: HOME | End: 2020-07-14
Attending: INTERNAL MEDICINE
Payer: MEDICARE

## 2020-07-14 DIAGNOSIS — R91.8: Primary | ICD-10-CM

## 2020-07-14 DIAGNOSIS — C22.0: ICD-10-CM

## 2020-07-14 LAB — BUN SERPL-SCNC: 11 MG/DL (ref 7–17)

## 2020-07-14 PROCEDURE — 84520 ASSAY OF UREA NITROGEN: CPT

## 2020-07-14 PROCEDURE — 36415 COLL VENOUS BLD VENIPUNCTURE: CPT

## 2020-07-14 PROCEDURE — 82565 ASSAY OF CREATININE: CPT

## 2020-07-14 PROCEDURE — 71260 CT THORAX DX C+: CPT

## 2020-07-14 NOTE — CT
EXAMINATION TYPE: CT chest w con

 

DATE OF EXAM: 7/14/2020

 

COMPARISON: 6/11/2020 CT abdomen pelvis

 

HISTORY: Liver CA, hx of breast CA

 

CT DLP: 212 mGycm, Automated exposure control for dose reduction was used.

 

CONTRAST: Performed injected with 100 mL of Isovue 300.

 

TECHNIQUE: Axial images were obtained at 5 mm thick sections.  Reconstructed images are reviewed on Keko computer in the coronal plane. 

 

FINDINGS: Portion of the thyroid visualized is normal.

The esophagus is dilated and fluid-filled to the gastroesophageal junction. Additional evaluation of 
the gastroesophageal junction is recommended. Obvious mass is not identified. Consider esophagram to 
evaluate for stenosis.

 

There is a 0.3 cm nodule within the periphery of the anterior right middle lobe. Series 4 image 30. T
here is a nearby punctate peripheral density series 4 image 34. There is some triangular increased de
nsity within the posterior right lower lung field. Series 4 image 31. This measures approximately 0.3
 cm in width.

 

No enlarged mediastinal or hilar adenopathy is evident.   The ascending aorta diameter at the level o
f the main pulmonary artery is 3.2 cm.  The main pulmonary artery diameter at the bifurcation is 2.7 
cm.

 

Limited CT sections are obtained through the upper abdomen. Patient's known hepatic cancer is evident
. Ascites is present. Right adrenal gland is poorly visualized. Left adrenal gland appears normal. Th
ere is some patchy appearance to the slightly prominent spleen measuring 13.2 cm at this phase of con
trast. Underlying abnormality is not excluded. Gallbladder surgically absent.

 

IMPRESSIONS:

1. Couple of nonspecific right lung nodules. Monitoring in 3-6 months is recommended.

2. Dilated fluid-filled esophagus. Correlate for stenosis at the gastroesophageal junction. Consider 
esophagram for additional evaluation.

3. Patient's known hepatic neoplasm within the right lobe liver is evident. Ascites is present.

## 2020-07-17 ENCOUNTER — HOSPITAL ENCOUNTER (OUTPATIENT)
Dept: HOSPITAL 47 - ORWHC2ENDO | Age: 85
Discharge: HOME | End: 2020-07-17
Attending: INTERNAL MEDICINE
Payer: MEDICARE

## 2020-07-17 VITALS — RESPIRATION RATE: 16 BRPM | TEMPERATURE: 97.5 F

## 2020-07-17 VITALS — BODY MASS INDEX: 23.8 KG/M2

## 2020-07-17 VITALS — DIASTOLIC BLOOD PRESSURE: 56 MMHG | SYSTOLIC BLOOD PRESSURE: 110 MMHG | HEART RATE: 84 BPM

## 2020-07-17 DIAGNOSIS — K74.60: ICD-10-CM

## 2020-07-17 DIAGNOSIS — E07.9: ICD-10-CM

## 2020-07-17 DIAGNOSIS — K76.6: ICD-10-CM

## 2020-07-17 DIAGNOSIS — K22.8: Primary | ICD-10-CM

## 2020-07-17 DIAGNOSIS — Z88.0: ICD-10-CM

## 2020-07-17 DIAGNOSIS — Z79.899: ICD-10-CM

## 2020-07-17 DIAGNOSIS — Z79.890: ICD-10-CM

## 2020-07-17 DIAGNOSIS — K31.89: ICD-10-CM

## 2020-07-17 PROCEDURE — 43235 EGD DIAGNOSTIC BRUSH WASH: CPT

## 2020-07-17 NOTE — P.PCN
Date of Procedure: 07/17/20


Procedure(s) Performed: 


BRIEF HISTORY: Patient is a 85-year-old, pleasant,  white female scheduled for 

an upper endoscopy as a part of history of liver cirrhosis and screening for 

esophageal varices.  She was recently diagnosed with the biopsy results and and 

is scheduled to see Dr. Johnson. She had a CAT scan done 2 days ago that showed 

dilated esophagus.  Upper endoscopy was recommended


. 





PROCEDURE PERFORMED: Esophagogastroduodenoscopy.





PREOPERATIVE DIAGNOSIS:  cirrhosis of the liver/screening for esophageal 

varices/abnormal CAT scan showing dilated esophagus. 





IV sedation per anesthesia. 





PROCEDURE: After informed consent was obtained, the patient  was brought into 

the endoscopy unit. IV sedation was administered by Anesthesia under continuous 

monitoring. Initially the Olympus GIF-140 video endoscope was inserted into the 

mouth. Esophagus intubated without any difficulty. It was gradually advanced 

into the stomach and duodenum and carefully examined. The bulb and the second 

part of the duodenum appeared normal. The scope at this time was withdrawn to 

the stomach, adequately insufflated with air, and upon careful examination, 

mucosa of the antrum, body, cardia and the fundus  had changes consistent with 

portal hypertensive gastropathy.. The scope was then withdrawn into the esophagu

s. The GE junction was located at 39 cm from the incisors. The esophagus 

appeared   Slightly dilated with some fluid noted but there was no esophageal 

stricture noted.  The rest of esophagus appeared normal and the patient 

tolerated the procedure well. 





IMPRESSION: 


1. Dilated esophagus that was fluid-filled, but no evidence of esophageal 

stricture or esophageal mass.


2. Portal hypertensive gastropathy.


3.  No evidence of esophageal or gastric varices





RECOMMENDATIONS: The findings of this examination were discussed with the 

patient  as well as her family.  She was advised to follow up in office in 3-4 

weeks..

## 2020-07-22 ENCOUNTER — HOSPITAL ENCOUNTER (OUTPATIENT)
Dept: HOSPITAL 47 - RADPROMAIN | Age: 85
Discharge: HOME | End: 2020-07-22
Attending: INTERNAL MEDICINE
Payer: MEDICARE

## 2020-07-22 VITALS — HEART RATE: 67 BPM | RESPIRATION RATE: 16 BRPM | SYSTOLIC BLOOD PRESSURE: 110 MMHG | DIASTOLIC BLOOD PRESSURE: 67 MMHG

## 2020-07-22 VITALS — TEMPERATURE: 98.2 F

## 2020-07-22 DIAGNOSIS — Z88.5: ICD-10-CM

## 2020-07-22 DIAGNOSIS — Z88.0: ICD-10-CM

## 2020-07-22 DIAGNOSIS — R18.8: Primary | ICD-10-CM

## 2020-07-22 LAB
INR PPP: 1.3 (ref ?–1.2)
PLATELET # BLD AUTO: 143 K/UL (ref 150–450)
PT BLD: 13.2 SEC (ref 9–12)

## 2020-07-22 PROCEDURE — 49083 ABD PARACENTESIS W/IMAGING: CPT

## 2020-07-22 PROCEDURE — 85049 AUTOMATED PLATELET COUNT: CPT

## 2020-07-22 PROCEDURE — 36415 COLL VENOUS BLD VENIPUNCTURE: CPT

## 2020-07-22 PROCEDURE — 85610 PROTHROMBIN TIME: CPT

## 2020-07-22 NOTE — US
EXAMINATION TYPE: US paracentesis abd w/image

 

DATE OF EXAM: 7/22/2020

 

CLINICAL HISTORY:  Ascites

 

The procedure was discussed with the patient. The risks, complications, benefits, and alternatives we
re discussed and any questions were answered. Informed consent was obtained. Preliminary scanning dem
onstrated moderate right and small left volume ascites. [facility] and prepped and draped in the usual sterile fashion. 

 

All elements of maximal barrier technique were utilized.  Under ultrasound guidance, access into the 
right lower quadrant was obtained with a 5 Maori one-step centesis catheter. 

 

Approximately 4.25 liters of clear serous fluid was removed. Catheter was removed and sterile bandage
 was applied. The patient was stable throughout the procedure and remained stable upon discharge from
 Department of Radiology.

 

IMPRESSION: 

Successful ultrasound-guided paracentesis, with removal of 4.25 liters of clear serous fluid.

## 2020-08-12 ENCOUNTER — HOSPITAL ENCOUNTER (OUTPATIENT)
Dept: HOSPITAL 47 - RADPROMAIN | Age: 85
Discharge: HOME | End: 2020-08-12
Attending: INTERNAL MEDICINE
Payer: MEDICARE

## 2020-08-12 VITALS — RESPIRATION RATE: 16 BRPM | TEMPERATURE: 98.2 F

## 2020-08-12 VITALS — HEART RATE: 80 BPM | SYSTOLIC BLOOD PRESSURE: 112 MMHG | DIASTOLIC BLOOD PRESSURE: 68 MMHG

## 2020-08-12 DIAGNOSIS — R18.8: Primary | ICD-10-CM

## 2020-08-12 DIAGNOSIS — Z88.0: ICD-10-CM

## 2020-08-12 DIAGNOSIS — Z91.013: ICD-10-CM

## 2020-08-12 LAB
INR PPP: 1.4 (ref ?–1.2)
PLATELET # BLD AUTO: 164 K/UL (ref 150–450)
PT BLD: 14.3 SEC (ref 9–12)

## 2020-08-12 PROCEDURE — 49083 ABD PARACENTESIS W/IMAGING: CPT

## 2020-08-12 PROCEDURE — 36415 COLL VENOUS BLD VENIPUNCTURE: CPT

## 2020-08-12 PROCEDURE — 85049 AUTOMATED PLATELET COUNT: CPT

## 2020-08-12 PROCEDURE — 85610 PROTHROMBIN TIME: CPT

## 2020-08-12 NOTE — US
EXAMINATION TYPE: US paracentesis abd w/image

 

DATE OF EXAM: 8/12/2020

 

CLINICAL HISTORY:  Ascites

 

: Dr. Liseth Matamoros.

 

Preprocedure preliminary imaging demonstrate large filling ascites in the bilateral lower quadrants.

 

The procedure was discussed with the patient. The risks, complications, benefits, and alternatives we
re discussed and any questions were answered. Informed consent was obtained. The patient was placed s
upine on the ultrasound table and prepped and draped in the usual sterile fashion. 

 

All elements of maximal barrier technique were utilized.  Under ultrasound guidance, access into the 
right lower quadrant was obtained with a 5 Sami one-step centesis catheter. 

 

Approximately 5.4 liters of clear serous fluid was removed. Catheter was removed and sterile bandage 
was applied. The patient was stable throughout the procedure and remained stable upon discharge from 
Department of Radiology.

 

IMPRESSION: 

Successful ultrasound-guided paracentesis, with removal of 5.4 liters of clear serous fluid.

## 2020-08-30 ENCOUNTER — HOSPITAL ENCOUNTER (OUTPATIENT)
Dept: HOSPITAL 47 - EC | Age: 85
Setting detail: OBSERVATION
LOS: 1 days | Discharge: HOME | End: 2020-08-31
Attending: INTERNAL MEDICINE | Admitting: INTERNAL MEDICINE
Payer: MEDICARE

## 2020-08-30 DIAGNOSIS — K74.60: Primary | ICD-10-CM

## 2020-08-30 DIAGNOSIS — Z79.899: ICD-10-CM

## 2020-08-30 DIAGNOSIS — R18.8: ICD-10-CM

## 2020-08-30 DIAGNOSIS — E87.1: ICD-10-CM

## 2020-08-30 DIAGNOSIS — Z85.3: ICD-10-CM

## 2020-08-30 DIAGNOSIS — K21.9: ICD-10-CM

## 2020-08-30 DIAGNOSIS — E87.70: ICD-10-CM

## 2020-08-30 DIAGNOSIS — Z79.890: ICD-10-CM

## 2020-08-30 DIAGNOSIS — C22.0: ICD-10-CM

## 2020-08-30 DIAGNOSIS — Z90.710: ICD-10-CM

## 2020-08-30 DIAGNOSIS — G92: ICD-10-CM

## 2020-08-30 DIAGNOSIS — E03.9: ICD-10-CM

## 2020-08-30 DIAGNOSIS — Z87.891: ICD-10-CM

## 2020-08-30 DIAGNOSIS — Z90.11: ICD-10-CM

## 2020-08-30 LAB
ALBUMIN SERPL-MCNC: 2.6 G/DL (ref 3.5–5)
ALP SERPL-CCNC: 116 U/L (ref 38–126)
ALT SERPL-CCNC: 21 U/L (ref 4–34)
ANION GAP SERPL CALC-SCNC: 7 MMOL/L
APTT BLD: 26.5 SEC (ref 22–30)
AST SERPL-CCNC: 93 U/L (ref 14–36)
BASOPHILS # BLD AUTO: 0.1 K/UL (ref 0–0.2)
BASOPHILS NFR BLD AUTO: 1 %
BUN SERPL-SCNC: 8 MG/DL (ref 7–17)
CALCIUM SPEC-MCNC: 8.8 MG/DL (ref 8.4–10.2)
CHLORIDE SERPL-SCNC: 102 MMOL/L (ref 98–107)
CO2 SERPL-SCNC: 24 MMOL/L (ref 22–30)
EOSINOPHIL # BLD AUTO: 0.1 K/UL (ref 0–0.7)
EOSINOPHIL NFR BLD AUTO: 2 %
ERYTHROCYTE [DISTWIDTH] IN BLOOD BY AUTOMATED COUNT: 3.85 M/UL (ref 3.8–5.4)
ERYTHROCYTE [DISTWIDTH] IN BLOOD: 15.1 % (ref 11.5–15.5)
GLUCOSE SERPL-MCNC: 85 MG/DL (ref 74–99)
HCT VFR BLD AUTO: 37.2 % (ref 34–46)
HGB BLD-MCNC: 11.8 GM/DL (ref 11.4–16)
INR PPP: 1.4 (ref ?–1.2)
LYMPHOCYTES # SPEC AUTO: 1.3 K/UL (ref 1–4.8)
LYMPHOCYTES NFR SPEC AUTO: 18 %
MCH RBC QN AUTO: 30.7 PG (ref 25–35)
MCHC RBC AUTO-ENTMCNC: 31.8 G/DL (ref 31–37)
MCV RBC AUTO: 96.7 FL (ref 80–100)
MONOCYTES # BLD AUTO: 0.5 K/UL (ref 0–1)
MONOCYTES NFR BLD AUTO: 7 %
NEUTROPHILS # BLD AUTO: 5.4 K/UL (ref 1.3–7.7)
NEUTROPHILS NFR BLD AUTO: 72 %
PLATELET # BLD AUTO: 184 K/UL (ref 150–450)
POTASSIUM SERPL-SCNC: 3.9 MMOL/L (ref 3.5–5.1)
PROT SERPL-MCNC: 6.6 G/DL (ref 6.3–8.2)
PT BLD: 13.7 SEC (ref 9–12)
SODIUM SERPL-SCNC: 133 MMOL/L (ref 137–145)
WBC # BLD AUTO: 7.5 K/UL (ref 3.8–10.6)

## 2020-08-30 PROCEDURE — 96375 TX/PRO/DX INJ NEW DRUG ADDON: CPT

## 2020-08-30 PROCEDURE — 49083 ABD PARACENTESIS W/IMAGING: CPT

## 2020-08-30 PROCEDURE — 82140 ASSAY OF AMMONIA: CPT

## 2020-08-30 PROCEDURE — 85730 THROMBOPLASTIN TIME PARTIAL: CPT

## 2020-08-30 PROCEDURE — 88108 CYTOPATH CONCENTRATE TECH: CPT

## 2020-08-30 PROCEDURE — 36415 COLL VENOUS BLD VENIPUNCTURE: CPT

## 2020-08-30 PROCEDURE — 85610 PROTHROMBIN TIME: CPT

## 2020-08-30 PROCEDURE — 88305 TISSUE EXAM BY PATHOLOGIST: CPT

## 2020-08-30 PROCEDURE — 85027 COMPLETE CBC AUTOMATED: CPT

## 2020-08-30 PROCEDURE — 80053 COMPREHEN METABOLIC PANEL: CPT

## 2020-08-30 PROCEDURE — 85025 COMPLETE CBC W/AUTO DIFF WBC: CPT

## 2020-08-30 PROCEDURE — 96374 THER/PROPH/DIAG INJ IV PUSH: CPT

## 2020-08-30 PROCEDURE — 96376 TX/PRO/DX INJ SAME DRUG ADON: CPT

## 2020-08-30 PROCEDURE — 99284 EMERGENCY DEPT VISIT MOD MDM: CPT

## 2020-08-30 RX ADMIN — FUROSEMIDE SCH MG: 10 INJECTION, SOLUTION INTRAMUSCULAR; INTRAVENOUS at 20:57

## 2020-08-30 NOTE — P.HPIM
History of Present Illness


H&P Date: 08/30/20


Chief Complaint: abdominal distension


Patient is an 85-year-old female with known cirrhosis with refractory ascites 

requiring recurrent paracentesis with the last one 8/12/2020, diagnosed with 

hepatocellular carcinoma in June 2020, and GERD who presented to the ER with 

complaints of abdominal distention.  Her vital signs are within normal limits.  

INR 1.4, sodium 133, total bilirubin 1.4, AST 93, albumin 2.6.  She will be 

placed in obs for decompensated cirrhosis. 





Patient seen and examined at bedside. Increased abdominal distension over 2 day.

Feeling uncomfortable. No shortness of breath. No nausea. No change in 

mentation. 














She does have a standing order for paracentesis however is extremely 

uncomfortable and this cannot be performed until tomorrow.








Review of Systems


Pertinent positives and negatives as discussed in HPI, a complete review of 

systems was performed and all other systems are negative.








Past Medical History


Past Medical History: Cancer, GERD/Reflux, Thyroid Disorder


Additional Past Medical History / Comment(s): breast cancer 1980, liver cancer 

new diagnosis June 2020, cirrhosis


History of Any Multi-Drug Resistant Organisms: None Reported


Past Surgical History: Breast Surgery, Cholecystectomy, Hysterectomy


Additional Past Surgical History / Comment(s): R mastectomy, liver biopsy, 

multiple paracentesis


Past Anesthesia/Blood Transfusion Reactions: No Reported Reaction


Past Psychological History: Anxiety, Depression


Smoking Status: Former smoker


Past Alcohol Use History: None Reported


Past Drug Use History: None Reported


Additional History: No cane or walker.  Lives with her daughter





- Past Family History


  ** Mother


Family Medical History: Cancer





Medications and Allergies


                                Home Medications











 Medication  Instructions  Recorded  Confirmed  Type


 


HYDROcodone/APAP 5-325MG [Norco 1 tab PO Q6HR PRN #10 tab 05/18/20 08/12/20 Rx





5-325]    


 


Benzonatate [Tessalon Perles] 100 mg PO HS 06/12/20 08/12/20 History


 


Cholecalciferol [Vitamin D3 (25 1,000 unit PO DAILY 06/12/20 08/12/20 History





Mcg = 1000 Iu)]    


 


FLUoxetine HCL 40 mg PO DAILY 06/12/20 08/12/20 History


 


Levothyroxine Sodium [Synthroid] 150 mcg PO DAILY 06/12/20 08/12/20 History


 


Pantoprazole [Protonix] 40 mg PO DAILY PRN 06/12/20 08/12/20 History


 


Furosemide [Lasix] 40 mg PO DAILY #30 tab 06/16/20 08/12/20 Rx


 


Spironolactone [Aldactone] 25 mg PO DAILY 07/15/20 08/12/20 History








                                    Allergies











Allergy/AdvReac Type Severity Reaction Status Date / Time


 


Penicillins Allergy  Unknown Verified 08/30/20 15:00





   Childhood  














Physical Exam


Osteopathic Statement: *.  No significant issues noted on an osteopathic 

structural exam other than those noted in the History and Physical/Consult.


Vitals: 


                                   Vital Signs











  Temp Pulse Resp BP Pulse Ox


 


 08/30/20 16:29   94  18  109/61  98


 


 08/30/20 15:27  98.9 F  93  18  118/65  95


 


 08/30/20 14:52  98.0 F  64  18  100/60  97








                                Intake and Output











 08/30/20 08/30/20 08/30/20





 06:59 14:59 22:59


 


Other:   


 


  Weight  57.606 kg 











General: non toxic, no distress, appears at stated age


Derm:  warm, dry


Head: atraumatic, normocephalic, symmetric


Eyes: EOMI, no lid lag, anicteric sclera, pupils equal round reactive to light


ENT: Nose and ears atraumatic, no thrush,  no pharyngeal erythema


Neck: No thyromegaly, no cervical lymphadenopathy, trachea midline, supple


Mouth: no lip lesion, mucus membranes moist


Cardiovascular: S1S2 reg, no murmur, positive posterior tibial pulse bilateral, 

no edema, capillary refill less than 2 seconds


Lungs: Decreased bs bilateral, no ronchi, no rales, no wheeze, no accessory 

muscle use


Abdominal: soft, + distended,  +tender to palpation diffusely, no guarding, no 

appreciable organomegaly, normal bowel sounds


Ext: no gross muscle atrophy,  muscle strength muscle strength 4 out of 5 in all

 4 extremities,  no contractures


Neuro:  CN II-XI grossly intact, light touch intact all 4 extremities, finger to

 nose within normal limits,


Psych: Alert, oriented, appropriate affect








Results


CBC & Chem 7: 


                                 08/30/20 15:26





                                 08/30/20 15:26


Labs: 


                  Abnormal Lab Results - Last 24 Hours (Table)











  08/30/20 08/30/20 Range/Units





  15:26 15:26 


 


PT  13.7 H   (9.0-12.0)  sec


 


INR  1.4 H   (<1.2)  


 


Sodium   133 L  (137-145)  mmol/L


 


Total Bilirubin   1.4 H  (0.2-1.3)  mg/dL


 


AST   93 H  (14-36)  U/L


 


Albumin   2.6 L  (3.5-5.0)  g/dL














Thrombosis Risk Factor Assmnt





- DVT/VTE Prophylaxis


DVT/VTE Prophylaxis: Mechanical Prophylaxis ordered





Assessment and Plan


Assessment: 


Decompensated cirrhosis 


- IV lasix tonight, aldactone


- Consult IR for paracentesis in AM


- check stat ammonia level 





Hepatocellular carcinoma 


- outpatient follow-up





Hyponatremia


- due to fluid overload


- follow BMP in AM after lasix 





Acute toxic metabolic encephalopathy


- possibly due to morphine


- check ammonia level


- support care


- limit narcotics





Hypothyroidism


- synthroid 





The patient is placed in observation with an anticipated less than 2 midnight 

stay for evaluation of Decompensated cirrhosis.


Surrogate decision-maker: daughter


CODE STATUS: full


DVT prophylaxis: SCDs


Discussed with: patient, nursing, ED physician


Anticipated discharge date: in AM


Anticipated discharge place: home, ? palliative 


A total of 65 minutes was spent on the care of this complex patient more than 

50% of the time was spent in counseling and care coordination.

## 2020-08-30 NOTE — ED
General Adult HPI





- General


Chief complaint: Extremity Problem,Nontraumatic


Stated complaint: Abdominal Swelling


Time Seen by Provider: 08/30/20 15:04


Source: patient, RN notes reviewed, old records reviewed


Mode of arrival: wheelchair


Limitations: no limitations





- History of Present Illness


Initial comments: 





85-year-old female history of liver cirrhosis, liver mass, recurrent abdominal 

ascites presenting with abdominal distention, pain.  No fever.  No vomiting.  

She additionally reports bilateral lower extremity swelling.  No difficulty 

breathing.  She is scheduled for paracentesis on Wednesday of this week but has 

been unable to tolerate the pain and distention.





- Related Data


                                Home Medications











 Medication  Instructions  Recorded  Confirmed


 


Benzonatate [Tessalon Perles] 100 mg PO HS 06/12/20 08/12/20


 


Cholecalciferol [Vitamin D3 (25 1,000 unit PO DAILY 06/12/20 08/12/20





Mcg = 1000 Iu)]   


 


FLUoxetine HCL 40 mg PO DAILY 06/12/20 08/12/20


 


Levothyroxine Sodium [Synthroid] 150 mcg PO DAILY 06/12/20 08/12/20


 


Pantoprazole [Protonix] 40 mg PO DAILY PRN 06/12/20 08/12/20


 


Spironolactone [Aldactone] 25 mg PO DAILY 07/15/20 08/12/20








                                  Previous Rx's











 Medication  Instructions  Recorded


 


HYDROcodone/APAP 5-325MG [Norco 1 tab PO Q6HR PRN #10 tab 05/18/20





5-325]  


 


Furosemide [Lasix] 40 mg PO DAILY #30 tab 06/16/20











                                    Allergies











Allergy/AdvReac Type Severity Reaction Status Date / Time


 


Penicillins Allergy  Unknown Verified 08/30/20 15:00





   Childhood  














Review of Systems


ROS Statement: 


Those systems with pertinent positive or pertinent negative responses have been 

documented in the HPI.





ROS Other: All systems not noted in ROS Statement are negative.





Past Medical History


Past Medical History: Cancer, GERD/Reflux, Thyroid Disorder


Additional Past Medical History / Comment(s): breast cancer 1980, liver cancer 

new diagnosis June 2020


History of Any Multi-Drug Resistant Organisms: None Reported


Past Surgical History: Breast Surgery, Cholecystectomy, Hysterectomy


Additional Past Surgical History / Comment(s): R mastectomy, liver biopsy, 

multiple paracentesis


Past Anesthesia/Blood Transfusion Reactions: No Reported Reaction


Past Psychological History: Anxiety, Depression


Smoking Status: Former smoker


Past Alcohol Use History: None Reported


Past Drug Use History: None Reported





- Past Family History


  ** Mother


Family Medical History: Cancer





General Exam


Limitations: no limitations


General appearance: alert, in no apparent distress


Head exam: Present: atraumatic, normocephalic


Eye exam: Present: normal appearance, PERRL


ENT exam: Present: normal exam


Neck exam: Present: normal inspection.  Absent: tenderness, meningismus


Respiratory exam: Present: normal lung sounds bilaterally.  Absent: respiratory 

distress, wheezes


Cardiovascular Exam: Present: regular rate, normal rhythm


GI/Abdominal exam: Present: distended.  Absent: tenderness, guarding, rebound


Extremities exam: Present: pedal edema





Course





                                   Vital Signs











  08/30/20 08/30/20





  14:52 15:27


 


Temperature 98.0 F 


 


Pulse Rate 64 93


 


Respiratory 18 18





Rate  


 


Blood Pressure 100/60 118/65


 


O2 Sat by Pulse 97 95





Oximetry  














Medical Decision Making





- Medical Decision Making





85-year-old female with abdominal ascites, will require paracentesis.  She will 

be admitted for pain control, interventional radiology has been placed on 

consult, case has been discussed with Dr. Hines who will admit.





- Lab Data


Result diagrams: 


                                 08/30/20 15:26





                                 08/30/20 15:26





                                   Lab Results











  08/30/20 08/30/20 08/30/20 Range/Units





  15:26 15:26 15:26 


 


WBC  7.5    (3.8-10.6)  k/uL


 


RBC  3.85    (3.80-5.40)  m/uL


 


Hgb  11.8    (11.4-16.0)  gm/dL


 


Hct  37.2    (34.0-46.0)  %


 


MCV  96.7    (80.0-100.0)  fL


 


MCH  30.7    (25.0-35.0)  pg


 


MCHC  31.8    (31.0-37.0)  g/dL


 


RDW  15.1    (11.5-15.5)  %


 


Plt Count  184    (150-450)  k/uL


 


Neutrophils %  72    %


 


Lymphocytes %  18    %


 


Monocytes %  7    %


 


Eosinophils %  2    %


 


Basophils %  1    %


 


Neutrophils #  5.4    (1.3-7.7)  k/uL


 


Lymphocytes #  1.3    (1.0-4.8)  k/uL


 


Monocytes #  0.5    (0-1.0)  k/uL


 


Eosinophils #  0.1    (0-0.7)  k/uL


 


Basophils #  0.1    (0-0.2)  k/uL


 


PT   13.7 H   (9.0-12.0)  sec


 


INR   1.4 H   (<1.2)  


 


APTT   26.5   (22.0-30.0)  sec


 


Sodium    133 L  (137-145)  mmol/L


 


Potassium    3.9  (3.5-5.1)  mmol/L


 


Chloride    102  ()  mmol/L


 


Carbon Dioxide    24  (22-30)  mmol/L


 


Anion Gap    7  mmol/L


 


BUN    8  (7-17)  mg/dL


 


Creatinine    0.81  (0.52-1.04)  mg/dL


 


Est GFR (CKD-EPI)AfAm    78  (>60 ml/min/1.73 sqM)  


 


Est GFR (CKD-EPI)NonAf    68  (>60 ml/min/1.73 sqM)  


 


Glucose    85  (74-99)  mg/dL


 


Calcium    8.8  (8.4-10.2)  mg/dL


 


Total Bilirubin    1.4 H  (0.2-1.3)  mg/dL


 


AST    93 H  (14-36)  U/L


 


ALT    21  (4-34)  U/L


 


Alkaline Phosphatase    116  ()  U/L


 


Total Protein    6.6  (6.3-8.2)  g/dL


 


Albumin    2.6 L  (3.5-5.0)  g/dL














Disposition


Clinical Impression: 


 Ascites, Abdominal distention





Disposition: ADMITTED AS IP TO THIS [facility]


Condition: Stable


Is patient prescribed a controlled substance at d/c from ED?: No


Referrals: 


Deuce Simmons [Primary Care Provider] - 1-2 days


Decision to Admit Reason: Admit from EC


Decision Date: 08/30/20


Decision Time: 16:23

## 2020-08-31 VITALS — HEART RATE: 110 BPM | SYSTOLIC BLOOD PRESSURE: 92 MMHG | DIASTOLIC BLOOD PRESSURE: 49 MMHG

## 2020-08-31 VITALS — TEMPERATURE: 98.4 F | RESPIRATION RATE: 14 BRPM

## 2020-08-31 LAB
ALBUMIN SERPL-MCNC: 2.4 G/DL (ref 3.5–5)
ALP SERPL-CCNC: 96 U/L (ref 38–126)
ALT SERPL-CCNC: 19 U/L (ref 4–34)
ANION GAP SERPL CALC-SCNC: 5 MMOL/L
AST SERPL-CCNC: 81 U/L (ref 14–36)
BUN SERPL-SCNC: 6 MG/DL (ref 7–17)
CALCIUM SPEC-MCNC: 8.1 MG/DL (ref 8.4–10.2)
CHLORIDE SERPL-SCNC: 103 MMOL/L (ref 98–107)
CO2 SERPL-SCNC: 26 MMOL/L (ref 22–30)
ERYTHROCYTE [DISTWIDTH] IN BLOOD BY AUTOMATED COUNT: 3.59 M/UL (ref 3.8–5.4)
ERYTHROCYTE [DISTWIDTH] IN BLOOD: 15 % (ref 11.5–15.5)
GLUCOSE SERPL-MCNC: 95 MG/DL (ref 74–99)
HCT VFR BLD AUTO: 34.9 % (ref 34–46)
HGB BLD-MCNC: 11 GM/DL (ref 11.4–16)
INR PPP: 1.5 (ref ?–1.2)
MCH RBC QN AUTO: 30.7 PG (ref 25–35)
MCHC RBC AUTO-ENTMCNC: 31.5 G/DL (ref 31–37)
MCV RBC AUTO: 97.4 FL (ref 80–100)
PLATELET # BLD AUTO: 129 K/UL (ref 150–450)
POTASSIUM SERPL-SCNC: 3.6 MMOL/L (ref 3.5–5.1)
PROT SERPL-MCNC: 6.1 G/DL (ref 6.3–8.2)
PT BLD: 14.5 SEC (ref 9–12)
SODIUM SERPL-SCNC: 134 MMOL/L (ref 137–145)
WBC # BLD AUTO: 6.1 K/UL (ref 3.8–10.6)

## 2020-08-31 RX ADMIN — FUROSEMIDE SCH MG: 10 INJECTION, SOLUTION INTRAMUSCULAR; INTRAVENOUS at 07:56

## 2020-08-31 NOTE — US
EXAMINATION TYPE: US paracentesis abd w/image

 

DATE OF EXAM: 8/31/2020

 

COMPARISON: NONE

 

HISTORY: Ascites.

 

PROCEDURE:

Maximal barrier technique was utilized.  The skin overlying a suitable pocket of fluid was localized 
with ultrasound and the overlying skin was prepped and draped.  Ultrasound was utilized with sterile 
technique. Lidocaine was used for local anesthesia and a skin nick made with a scalpel. Catheter was 
advanced under direct ultrasound guidance into a suitable pocket of fluid, was called to evaluate the
 catheter following 500 cc drainage. Troubleshooting the catheter was unsuccessful, catheter was elmo
del. Fresh puncture with a 5 Yemeni needle and catheter was performed, needle removed and catheter wa
s advanced  and approximately 5 liters of serous fluid were removed.  Catheter was withdrawn and hemo
stasis achieved.  There is no immediate complication; the patient is discharged in stable condition. 


 

IMPRESSION:  STATUS POST ULTRASOUND GUIDED PARACENTESIS FOR PALLIATION OF ASCITES.  THIS PROCEDURE WA
S PERFORMED BY THE UNDERSIGNED.

## 2020-08-31 NOTE — P.DS
Providers


Date of admission: 


08/30/20 16:19





Expected date of discharge: 08/31/20


Attending physician: 


Miriam Hines DO





Primary care physician: 


Deuce Simmons





Hospital Course: 


Discharge Diagnosis:


Decompensated cirrhosis 


Hepatocellular carcinoma 


Hyponatremia 


Toxic metabolic encephalopathy 





Hospital Course: 


Patient is an 85-year-old female with known cirrhosis with refractory ascites 

requiring recurrent paracentesis with the last one 8/12/2020, diagnosed with 

hepatocellular carcinoma in June 2020, and GERD who presented to the ER with 

complaints of abdominal distention.  Her vital signs are within normal limits.  

INR 1.4, sodium 133, total bilirubin 1.4, AST 93, albumin 2.6.  She wasplaced in

obs for decompensated cirrhosis. She was started on IV lasix. She underwent 

paracentesis with remove of 5L of fluid. She was much improved and determined 

stable for discharge home.  She will follow-up with Dr. Aguilar in 1 week. 








Patient seen and examined at bedside. No chest pain, SOB, or nasuea. Feeling 

much improved. 





Vital signs reviewed and stable. 


General: non toxic, no distress, appears at stated age


Derm: warm, dry


Head: atraumatic, normocephalic, symmetric


Eyes: EOMI, no lid lag, anicteric sclera


Mouth: no lip lesion, mucus membranes moist


Cardiovascular: S1S2 reg, no murmur, positive posterior tibial pulse bilateral, 


Lungs: CTA bilateral, no rhonchi, no rales , no accessory muscle use


Abdominal: soft,  nontender to palpation, no guarding, no appreciable 

organomegaly


Ext: no gross muscle atrophy,1+ edema, no contractures


Psych: Alert, oriented, appropriate affect 








A total of 35 minutes of time were spent preparing this complex discharge 

summary .





Patient Condition at Discharge: Stable





Plan - Discharge Summary


Discharge Rx Participant: Yes


New Discharge Prescriptions: 


Continue


   Benzonatate [Tessalon Perles] 100 mg PO HS


   Levothyroxine Sodium [Synthroid] 150 mcg PO DAILY


   Cholecalciferol [Vitamin D3 (25 Mcg = 1000 Iu)] 1,000 unit PO DAILY


   FLUoxetine HCL 40 mg PO DAILY


   Pantoprazole [Protonix] 40 mg PO DAILY PRN


     PRN Reason: Indigestion


   Furosemide [Lasix] 40 mg PO DAILY #30 tab


   Spironolactone 50 mg PO DAILY


   HYDROcodone/APAP 5-325MG [Norco 5-325] 1 tab PO Q8H PRN


     PRN Reason: Pain


Discharge Medication List





Benzonatate [Tessalon Perles] 100 mg PO HS 06/12/20 [History]


Cholecalciferol [Vitamin D3 (25 Mcg = 1000 Iu)] 1,000 unit PO DAILY 06/12/20 

[History]


FLUoxetine HCL 40 mg PO DAILY 06/12/20 [History]


Levothyroxine Sodium [Synthroid] 150 mcg PO DAILY 06/12/20 [History]


Pantoprazole [Protonix] 40 mg PO DAILY PRN 06/12/20 [History]


Furosemide [Lasix] 40 mg PO DAILY #30 tab 06/16/20 [Rx]


HYDROcodone/APAP 5-325MG [Norco 5-325] 1 tab PO Q8H PRN 08/31/20 [History]


Spironolactone 50 mg PO DAILY 08/31/20 [History]








Follow up Appointment(s)/Referral(s): 


Deuce Simmons [Primary Care Provider] - 1-2 days


Noni Johnson MD [STAFF PHYSICIAN] - 1 Week


Activity/Diet/Wound Care/Special Instructions: 


Activity:


as tolerated





Diet: 


low sodium, 2L fluid restriction





Discharge Disposition: HOME SELF-CARE

## 2020-09-21 ENCOUNTER — HOSPITAL ENCOUNTER (OUTPATIENT)
Dept: HOSPITAL 47 - RADPROMAIN | Age: 85
Discharge: HOME | End: 2020-09-21
Attending: INTERNAL MEDICINE
Payer: MEDICARE

## 2020-09-21 VITALS — DIASTOLIC BLOOD PRESSURE: 69 MMHG | SYSTOLIC BLOOD PRESSURE: 117 MMHG | HEART RATE: 79 BPM

## 2020-09-21 VITALS — RESPIRATION RATE: 16 BRPM | TEMPERATURE: 98.3 F

## 2020-09-21 DIAGNOSIS — Z88.5: ICD-10-CM

## 2020-09-21 DIAGNOSIS — Z88.0: ICD-10-CM

## 2020-09-21 DIAGNOSIS — R18.8: Primary | ICD-10-CM

## 2020-09-21 LAB
INR PPP: 1.4 (ref ?–1.2)
PLATELET # BLD AUTO: 211 K/UL (ref 150–450)
PT BLD: 13.5 SEC (ref 9–12)

## 2020-09-21 PROCEDURE — 36415 COLL VENOUS BLD VENIPUNCTURE: CPT

## 2020-09-21 PROCEDURE — 49083 ABD PARACENTESIS W/IMAGING: CPT

## 2020-09-21 PROCEDURE — 85610 PROTHROMBIN TIME: CPT

## 2020-09-21 PROCEDURE — 85049 AUTOMATED PLATELET COUNT: CPT

## 2020-09-22 NOTE — US
Ultrasound-guided paracentesis.

 

DATE OF EXAM: 9/21/2020

 

CLINICAL HISTORY:  Ascites

 

The procedure was discussed with the patient. The risks, complications, benefits, and alternatives we
re discussed and any questions were answered. Informed consent was obtained. The patient was placed s
upine on the ultrasound table and prepped and draped in the usual sterile fashion. 

All elements of maximal barrier technique were utilized.  Under ultrasound guidance, access into the 
right lower quadrant was obtained, via the paracentesis catheter system and direct ultrasound guidanc
e.

 

Approximately 6.9 liters of straw-colored fluid was removed. The patient was stable throughout the pr
ocedure and remained stable upon discharge from Department of Radiology.

 

IMPRESSION: 

Successful  paracentesis under ultrasound guidance.

## 2020-10-03 ENCOUNTER — HOSPITAL ENCOUNTER (OUTPATIENT)
Dept: HOSPITAL 47 - RADMRIMAIN | Age: 85
Discharge: HOME | End: 2020-10-03
Attending: RADIOLOGY
Payer: MEDICARE

## 2020-10-03 DIAGNOSIS — C22.0: Primary | ICD-10-CM

## 2020-10-03 PROCEDURE — 74183 MRI ABD W/O CNTR FLWD CNTR: CPT

## 2020-10-04 NOTE — MR
MR liver with and without contrast

 

HISTORY: Hepatocellular carcinoma

 

Multiplanar multisequence and postcontrast images obtained through the liver following 5.5 cc Gadavis
t IV

 

Correlation to MR liver 6/12/2020

 

There is been development of extensive ascites. There is artifact somewhat limits evaluation. Patient
's liver mass measures approximately 6.8 x 4.9 by 6.3 cm, changes of cirrhosis within the liver again
 noted. Mass shows heterogeneous signal, there are internal enhancing septations, enhancing wall.

 

Colonic wall thickening is present. No evident retroperitoneal adenopathy. Kidneys enhance unremarkab
ly. Adrenal glands, spleen are within normal limits. Dilation of the common bile duct may be due to p
ostcholecystectomy change. Pancreas is within normal limits. There are varices present within the abd
omen. Lung bases show no effusion.

 

IMPRESSION: Findings compatible with patient's history of hepatocellular carcinoma with slight interv
al growth suspected with cirrhosis and portal hypertension.

## 2020-10-12 ENCOUNTER — HOSPITAL ENCOUNTER (OUTPATIENT)
Dept: HOSPITAL 47 - RADPROMAIN | Age: 85
Discharge: HOME | End: 2020-10-12
Attending: INTERNAL MEDICINE
Payer: MEDICARE

## 2020-10-12 VITALS — SYSTOLIC BLOOD PRESSURE: 112 MMHG | HEART RATE: 92 BPM | DIASTOLIC BLOOD PRESSURE: 62 MMHG

## 2020-10-12 VITALS — RESPIRATION RATE: 18 BRPM

## 2020-10-12 VITALS — TEMPERATURE: 97.7 F

## 2020-10-12 DIAGNOSIS — R18.8: Primary | ICD-10-CM

## 2020-10-12 DIAGNOSIS — Z88.5: ICD-10-CM

## 2020-10-12 DIAGNOSIS — K74.60: ICD-10-CM

## 2020-10-12 DIAGNOSIS — Z88.0: ICD-10-CM

## 2020-10-12 LAB
ALBUMIN SERPL-MCNC: 2.6 G/DL (ref 3.5–5)
ALP SERPL-CCNC: 115 U/L (ref 38–126)
ALT SERPL-CCNC: 23 U/L (ref 4–34)
ANION GAP SERPL CALC-SCNC: 5 MMOL/L
AST SERPL-CCNC: 105 U/L (ref 14–36)
BASOPHILS # BLD AUTO: 0.1 K/UL (ref 0–0.2)
BASOPHILS NFR BLD AUTO: 1 %
BUN SERPL-SCNC: 13 MG/DL (ref 7–17)
CALCIUM SPEC-MCNC: 9.4 MG/DL (ref 8.4–10.2)
CHLORIDE SERPL-SCNC: 100 MMOL/L (ref 98–107)
CO2 SERPL-SCNC: 27 MMOL/L (ref 22–30)
EOSINOPHIL # BLD AUTO: 0.1 K/UL (ref 0–0.7)
EOSINOPHIL NFR BLD AUTO: 2 %
ERYTHROCYTE [DISTWIDTH] IN BLOOD BY AUTOMATED COUNT: 4.1 M/UL (ref 3.8–5.4)
ERYTHROCYTE [DISTWIDTH] IN BLOOD: 14.7 % (ref 11.5–15.5)
GLUCOSE SERPL-MCNC: 119 MG/DL (ref 74–99)
HCT VFR BLD AUTO: 40.3 % (ref 34–46)
HGB BLD-MCNC: 13.1 GM/DL (ref 11.4–16)
INR PPP: 1.4 (ref ?–1.2)
LYMPHOCYTES # SPEC AUTO: 1 K/UL (ref 1–4.8)
LYMPHOCYTES NFR SPEC AUTO: 17 %
MCH RBC QN AUTO: 31.9 PG (ref 25–35)
MCHC RBC AUTO-ENTMCNC: 32.5 G/DL (ref 31–37)
MCV RBC AUTO: 98.3 FL (ref 80–100)
MONOCYTES # BLD AUTO: 0.3 K/UL (ref 0–1)
MONOCYTES NFR BLD AUTO: 5 %
NEUTROPHILS # BLD AUTO: 4.5 K/UL (ref 1.3–7.7)
NEUTROPHILS NFR BLD AUTO: 74 %
PLATELET # BLD AUTO: 177 K/UL (ref 150–450)
POTASSIUM SERPL-SCNC: 3.2 MMOL/L (ref 3.5–5.1)
PROT SERPL-MCNC: 7 G/DL (ref 6.3–8.2)
PT BLD: 13.7 SEC (ref 9–12)
SODIUM SERPL-SCNC: 132 MMOL/L (ref 137–145)
WBC # BLD AUTO: 6.1 K/UL (ref 3.8–10.6)

## 2020-10-12 PROCEDURE — 85610 PROTHROMBIN TIME: CPT

## 2020-10-12 PROCEDURE — 80053 COMPREHEN METABOLIC PANEL: CPT

## 2020-10-12 PROCEDURE — 49083 ABD PARACENTESIS W/IMAGING: CPT

## 2020-10-12 PROCEDURE — 36415 COLL VENOUS BLD VENIPUNCTURE: CPT

## 2020-10-12 PROCEDURE — 85025 COMPLETE CBC W/AUTO DIFF WBC: CPT

## 2020-10-12 NOTE — US
EXAMINATION TYPE: US paracentesis abd w/image

 

DATE OF EXAM: 10/12/2020

 

CLINICAL HISTORY:  Ascites

 

COMPARISON: Ultrasound paracentesis 9/21/2020.

 

: Dr. Liseth Matamoros

 

PROCEDURE:

 

Preprocedure preliminary ultrasound imaging demonstrates large volume ascites.

 

The procedure was discussed with the patient. The risks, complications, benefits, and alternatives we
re discussed and any questions were answered. Informed consent was obtained. The patient was placed s
upine on the ultrasound table and prepped and draped in the usual sterile fashion. 

 

All elements of maximal barrier technique were utilized.  Under ultrasound guidance, access into the 
right lower quadrant was obtained with a 5 Tamazight one-step centesis catheter. 

 

Approximately 8.2 liters of clear serous fluid was removed. Catheter was removed and sterile bandage 
was applied. The patient was stable throughout the procedure and remained stable upon discharge from 
Department of Radiology.

 

IMPRESSION: 

Successful ultrasound-guided paracentesis, with removal of 8.2 liters of clear serous fluid.

## 2020-10-23 ENCOUNTER — HOSPITAL ENCOUNTER (OUTPATIENT)
Dept: HOSPITAL 47 - LABWHC1 | Age: 85
Discharge: HOME | End: 2020-10-23
Attending: RADIOLOGY
Payer: MEDICARE

## 2020-10-23 DIAGNOSIS — C22.0: Primary | ICD-10-CM

## 2020-10-23 DIAGNOSIS — Z85.3: ICD-10-CM

## 2020-10-23 PROCEDURE — 82140 ASSAY OF AMMONIA: CPT

## 2020-10-23 PROCEDURE — 36415 COLL VENOUS BLD VENIPUNCTURE: CPT

## 2020-11-03 ENCOUNTER — HOSPITAL ENCOUNTER (OUTPATIENT)
Dept: HOSPITAL 47 - RADPROMAIN | Age: 85
Discharge: HOME | End: 2020-11-03
Attending: INTERNAL MEDICINE
Payer: MEDICARE

## 2020-11-03 VITALS — SYSTOLIC BLOOD PRESSURE: 111 MMHG | DIASTOLIC BLOOD PRESSURE: 61 MMHG | HEART RATE: 95 BPM

## 2020-11-03 VITALS — RESPIRATION RATE: 16 BRPM | TEMPERATURE: 97.6 F

## 2020-11-03 DIAGNOSIS — Z88.5: ICD-10-CM

## 2020-11-03 DIAGNOSIS — Z88.0: ICD-10-CM

## 2020-11-03 DIAGNOSIS — R18.8: Primary | ICD-10-CM

## 2020-11-03 LAB
INR PPP: 1.4 (ref ?–1.2)
PLATELET # BLD AUTO: 171 K/UL (ref 150–450)
PT BLD: 14 SEC (ref 9–12)

## 2020-11-03 PROCEDURE — 85610 PROTHROMBIN TIME: CPT

## 2020-11-03 PROCEDURE — 85049 AUTOMATED PLATELET COUNT: CPT

## 2020-11-03 PROCEDURE — 49083 ABD PARACENTESIS W/IMAGING: CPT

## 2020-11-03 PROCEDURE — 36415 COLL VENOUS BLD VENIPUNCTURE: CPT

## 2020-11-03 NOTE — US
EXAMINATION TYPE: US paracentesis abd w/image

 

DATE OF EXAM: 11/3/2020

 

COMPARISON: NONE

 

HISTORY: Ascites.

 

PROCEDURE:

Maximal barrier technique was utilized.  The skin overlying a suitable pocket of fluid was localized 
with ultrasound and the overlying skin was prepped and draped.  Ultrasound was utilized with sterile 
technique. Lidocaine was used for local anesthesia and a skin nick made with a scalpel. Catheter was 
advanced under direct ultrasound guidance into a suitable pocket of fluid and approximately 7 liters 
of serous fluid were removed.  Catheter was withdrawn and hemostasis achieved.  There is no immediate
 complication; the patient is discharged in stable condition. 

 

IMPRESSION:  STATUS POST ULTRASOUND GUIDED PARACENTESIS FOR PALLIATION OF ASCITES.  THIS PROCEDURE WA
S PERFORMED BY THE UNDERSIGNED.

## 2020-11-19 ENCOUNTER — HOSPITAL ENCOUNTER (OUTPATIENT)
Dept: HOSPITAL 47 - LABWHC1 | Age: 85
Discharge: HOME | End: 2020-11-19
Attending: RADIOLOGY
Payer: MEDICARE

## 2020-11-19 DIAGNOSIS — Z85.3: ICD-10-CM

## 2020-11-19 DIAGNOSIS — C22.0: Primary | ICD-10-CM

## 2020-11-19 PROCEDURE — 80076 HEPATIC FUNCTION PANEL: CPT

## 2020-11-19 PROCEDURE — 80048 BASIC METABOLIC PNL TOTAL CA: CPT

## 2020-11-19 PROCEDURE — 36415 COLL VENOUS BLD VENIPUNCTURE: CPT

## 2020-11-20 LAB
ALBUMIN SERPL-MCNC: 2.6 G/DL (ref 3.8–4.9)
ALBUMIN/GLOB SERPL: 0.65 G/DL (ref 1.6–3.17)
ALP SERPL-CCNC: 119 U/L (ref 41–126)
ALT SERPL-CCNC: 24 U/L (ref 8–44)
ANION GAP SERPL CALC-SCNC: 14.8 MMOL/L (ref 4–12)
AST SERPL-CCNC: 73 U/L (ref 13–35)
BILIRUB INDIRECT SERPL-MCNC: 1.4 MG/DL
BUN SERPL-SCNC: 34 MG/DL (ref 9–27)
BUN/CREAT SERPL: 20 RATIO (ref 12–20)
CALCIUM SPEC-MCNC: 9 MG/DL (ref 8.7–10.3)
CHLORIDE SERPL-SCNC: 96 MMOL/L (ref 96–109)
CO2 SERPL-SCNC: 21.2 MMOL/L (ref 21.6–31.8)
GLOBULIN SER CALC-MCNC: 4 G/DL (ref 1.6–3.3)
GLUCOSE SERPL-MCNC: 114 MG/DL (ref 70–110)
POTASSIUM SERPL-SCNC: 4.8 MMOL/L (ref 3.5–5.5)
PROT SERPL-MCNC: 6.6 G/DL (ref 6.2–8.2)
SODIUM SERPL-SCNC: 132 MMOL/L (ref 135–145)

## 2020-12-08 ENCOUNTER — HOSPITAL ENCOUNTER (OUTPATIENT)
Dept: HOSPITAL 47 - RADPROMAIN | Age: 85
Discharge: HOSPICE | End: 2020-12-08
Attending: INTERNAL MEDICINE
Payer: MEDICARE

## 2020-12-08 VITALS — SYSTOLIC BLOOD PRESSURE: 102 MMHG | RESPIRATION RATE: 16 BRPM | DIASTOLIC BLOOD PRESSURE: 68 MMHG | HEART RATE: 112 BPM

## 2020-12-08 VITALS — TEMPERATURE: 97.8 F

## 2020-12-08 DIAGNOSIS — R18.8: Primary | ICD-10-CM

## 2020-12-08 DIAGNOSIS — Z88.5: ICD-10-CM

## 2020-12-08 DIAGNOSIS — Z88.0: ICD-10-CM

## 2020-12-08 LAB
INR PPP: 1.9 (ref ?–1.2)
PLATELET # BLD AUTO: 149 K/UL (ref 150–450)
PT BLD: 18.8 SEC (ref 9–12)

## 2020-12-08 PROCEDURE — 85049 AUTOMATED PLATELET COUNT: CPT

## 2020-12-08 PROCEDURE — 85610 PROTHROMBIN TIME: CPT

## 2020-12-08 PROCEDURE — 49083 ABD PARACENTESIS W/IMAGING: CPT

## 2020-12-08 PROCEDURE — 36415 COLL VENOUS BLD VENIPUNCTURE: CPT

## 2020-12-08 NOTE — US
EXAMINATION TYPE: US paracentesis abd w/image

 

DATE OF EXAM: 12/8/2020

 

COMPARISON: NONE

 

HISTORY: Ascites.

 

PROCEDURE:

Maximal barrier technique was utilized.  The skin overlying a suitable pocket of fluid was localized 
with ultrasound and the overlying skin was prepped and draped.  Ultrasound was utilized with sterile 
technique. Lidocaine was used for local anesthesia and a skin nick made with a scalpel. Catheter was 
advanced under direct ultrasound guidance into a suitable pocket of fluid and approximately 3.2 liter
s of serous fluid were removed.  Catheter was withdrawn and hemostasis achieved.  There is no immedia
te complication; the patient is discharged in stable condition. 

 

IMPRESSION:  STATUS POST ULTRASOUND GUIDED PARACENTESIS FOR PALLIATION OF ASCITES.  THIS PROCEDURE WA
S PERFORMED BY THE UNDERSIGNED.

## 2024-01-01 NOTE — US
Ultrasound-guided paracentesis.

 

DATE OF EXAM: 7/1/2020

 

CLINICAL HISTORY:  Ascites

 

The procedure was discussed with the patient. The risks, complications, benefits, and alternatives we
re discussed and any questions were answered. Informed consent was obtained. The patient was placed s
upine on the ultrasound table and prepped and draped in the usual sterile fashion. 

All elements of maximal barrier technique were utilized.  Under ultrasound guidance, access into the 
right lower quadrant was obtained, via the paracentesis catheter system and direct ultrasound guidanc
e.

 

Approximately 2.2 liters of straw-colored fluid was removed. The patient was stable throughout the pr
ocedure and remained stable upon discharge from Department of Radiology.

 

IMPRESSION: 

Successful  paracentesis under ultrasound guidance.
None